# Patient Record
Sex: FEMALE | Race: WHITE | NOT HISPANIC OR LATINO | Employment: UNEMPLOYED | URBAN - NONMETROPOLITAN AREA
[De-identification: names, ages, dates, MRNs, and addresses within clinical notes are randomized per-mention and may not be internally consistent; named-entity substitution may affect disease eponyms.]

---

## 2017-05-23 ENCOUNTER — TRANSCRIBE ORDERS (OUTPATIENT)
Dept: ADMINISTRATIVE | Facility: HOSPITAL | Age: 82
End: 2017-05-23

## 2017-05-23 DIAGNOSIS — M81.0 OSTEOPOROSIS: Primary | ICD-10-CM

## 2017-06-09 ENCOUNTER — HOSPITAL ENCOUNTER (OUTPATIENT)
Dept: BONE DENSITY | Facility: HOSPITAL | Age: 82
Discharge: HOME | End: 2017-06-09
Attending: INTERNAL MEDICINE

## 2019-03-11 ENCOUNTER — HOSPITAL ENCOUNTER (INPATIENT)
Facility: HOSPITAL | Age: 84
LOS: 4 days | Discharge: HOME | End: 2019-03-15
Attending: EMERGENCY MEDICINE | Admitting: INTERNAL MEDICINE

## 2019-03-11 ENCOUNTER — APPOINTMENT (OUTPATIENT)
Dept: GENERAL RADIOLOGY | Facility: HOSPITAL | Age: 84
End: 2019-03-11

## 2019-03-11 DIAGNOSIS — Z79.01 ANTICOAGULATION MANAGEMENT ENCOUNTER: ICD-10-CM

## 2019-03-11 DIAGNOSIS — J90 PLEURAL EFFUSION, LEFT: ICD-10-CM

## 2019-03-11 DIAGNOSIS — E87.79 OTHER HYPERVOLEMIA: ICD-10-CM

## 2019-03-11 DIAGNOSIS — Z51.81 ANTICOAGULATION MANAGEMENT ENCOUNTER: ICD-10-CM

## 2019-03-11 DIAGNOSIS — L03.119 CELLULITIS OF LOWER EXTREMITY, UNSPECIFIED LATERALITY: ICD-10-CM

## 2019-03-11 DIAGNOSIS — I48.91 ATRIAL FIBRILLATION WITH RVR (HCC): Primary | ICD-10-CM

## 2019-03-11 DIAGNOSIS — I50.9 ACUTE CONGESTIVE HEART FAILURE, UNSPECIFIED HEART FAILURE TYPE (HCC): ICD-10-CM

## 2019-03-11 LAB
ALBUMIN SERPL-MCNC: 2.73 G/DL (ref 3.5–5.2)
ALBUMIN/GLOB SERPL: 0.6 G/DL
ALP SERPL-CCNC: 101 U/L (ref 39–117)
ALT SERPL W P-5'-P-CCNC: 12 U/L (ref 1–33)
ANION GAP SERPL CALCULATED.3IONS-SCNC: 10.9 MMOL/L
APTT PPP: 28.8 SECONDS (ref 23.8–36.1)
APTT PPP: 31 SECONDS (ref 23.8–36.1)
APTT PPP: 35 SECONDS (ref 23.8–36.1)
AST SERPL-CCNC: 28 U/L (ref 1–32)
BASOPHILS # BLD AUTO: 0.05 10*3/MM3 (ref 0–0.2)
BASOPHILS NFR BLD AUTO: 0.5 % (ref 0–1.5)
BILIRUB SERPL-MCNC: 0.8 MG/DL (ref 0.1–1.2)
BUN BLD-MCNC: 12 MG/DL (ref 8–23)
BUN/CREAT SERPL: 19.4 (ref 7–25)
CALCIUM SPEC-SCNC: 9 MG/DL (ref 8.6–10.5)
CHLORIDE SERPL-SCNC: 105 MMOL/L (ref 98–107)
CK MB SERPL-CCNC: 1.06 NG/ML
CK MB SERPL-CCNC: 1.16 NG/ML
CK SERPL-CCNC: 29 U/L (ref 20–180)
CK SERPL-CCNC: 31 U/L (ref 20–180)
CO2 SERPL-SCNC: 25.1 MMOL/L (ref 22–29)
CREAT BLD-MCNC: 0.62 MG/DL (ref 0.57–1)
CRP SERPL-MCNC: 0.4 MG/DL (ref 0–0.5)
D-LACTATE SERPL-SCNC: 1.7 MMOL/L (ref 0.5–2)
DEPRECATED RDW RBC AUTO: 53.3 FL (ref 37–54)
EOSINOPHIL # BLD AUTO: 0.2 10*3/MM3 (ref 0–0.4)
EOSINOPHIL NFR BLD AUTO: 1.9 % (ref 0.3–6.2)
ERYTHROCYTE [DISTWIDTH] IN BLOOD BY AUTOMATED COUNT: 15 % (ref 12.3–15.4)
GFR SERPL CREATININE-BSD FRML MDRD: 91 ML/MIN/1.73
GLOBULIN UR ELPH-MCNC: 4.3 GM/DL
GLUCOSE BLD-MCNC: 101 MG/DL (ref 65–99)
HCT VFR BLD AUTO: 44.8 % (ref 34–46.6)
HGB BLD-MCNC: 14.1 G/DL (ref 12–15.9)
IMM GRANULOCYTES # BLD AUTO: 0.02 10*3/MM3 (ref 0–0.05)
IMM GRANULOCYTES NFR BLD AUTO: 0.2 % (ref 0–0.5)
INR PPP: 1.18 (ref 0.9–1.1)
INR PPP: 1.21 (ref 0.9–1.1)
LYMPHOCYTES # BLD AUTO: 2.26 10*3/MM3 (ref 0.7–3.1)
LYMPHOCYTES NFR BLD AUTO: 21.6 % (ref 19.6–45.3)
MAGNESIUM SERPL-MCNC: 1.8 MG/DL (ref 1.6–2.4)
MCH RBC QN AUTO: 31.1 PG (ref 26.6–33)
MCHC RBC AUTO-ENTMCNC: 31.5 G/DL (ref 31.5–35.7)
MCV RBC AUTO: 98.7 FL (ref 79–97)
MONOCYTES # BLD AUTO: 1.24 10*3/MM3 (ref 0.1–0.9)
MONOCYTES NFR BLD AUTO: 11.9 % (ref 5–12)
MYOGLOBIN SERPL-MCNC: 42.1 NG/ML (ref 25–58)
MYOGLOBIN SERPL-MCNC: 48.7 NG/ML (ref 25–58)
NEUTROPHILS # BLD AUTO: 6.67 10*3/MM3 (ref 1.4–7)
NEUTROPHILS NFR BLD AUTO: 63.9 % (ref 42.7–76)
NT-PROBNP SERPL-MCNC: 1955 PG/ML (ref 0–1800)
PLATELET # BLD AUTO: 190 10*3/MM3 (ref 140–450)
PMV BLD AUTO: 10.4 FL (ref 6–12)
POTASSIUM BLD-SCNC: 3.6 MMOL/L (ref 3.5–5.2)
PROT SERPL-MCNC: 7 G/DL (ref 6–8.5)
PROTHROMBIN TIME: 15.2 SECONDS (ref 11–15.4)
PROTHROMBIN TIME: 15.5 SECONDS (ref 11–15.4)
RBC # BLD AUTO: 4.54 10*6/MM3 (ref 3.77–5.28)
SODIUM BLD-SCNC: 141 MMOL/L (ref 136–145)
TROPONIN T SERPL-MCNC: <0.01 NG/ML (ref 0–0.03)
TSH SERPL DL<=0.05 MIU/L-ACNC: 0.53 MIU/ML (ref 0.27–4.2)
TSH SERPL DL<=0.05 MIU/L-ACNC: 0.65 MIU/ML (ref 0.27–4.2)
WBC NRBC COR # BLD: 10.44 10*3/MM3 (ref 3.4–10.8)

## 2019-03-11 PROCEDURE — 82607 VITAMIN B-12: CPT | Performed by: PHYSICIAN ASSISTANT

## 2019-03-11 PROCEDURE — 25010000002 VANCOMYCIN 5 G RECONSTITUTED SOLUTION 5,000 MG VIAL: Performed by: EMERGENCY MEDICINE

## 2019-03-11 PROCEDURE — 85025 COMPLETE CBC W/AUTO DIFF WBC: CPT | Performed by: EMERGENCY MEDICINE

## 2019-03-11 PROCEDURE — 99223 1ST HOSP IP/OBS HIGH 75: CPT | Performed by: INTERNAL MEDICINE

## 2019-03-11 PROCEDURE — 25010000002 PIPERACILLIN-TAZOBACTAM: Performed by: EMERGENCY MEDICINE

## 2019-03-11 PROCEDURE — 82306 VITAMIN D 25 HYDROXY: CPT | Performed by: PHYSICIAN ASSISTANT

## 2019-03-11 PROCEDURE — 84443 ASSAY THYROID STIM HORMONE: CPT | Performed by: INTERNAL MEDICINE

## 2019-03-11 PROCEDURE — 83880 ASSAY OF NATRIURETIC PEPTIDE: CPT | Performed by: EMERGENCY MEDICINE

## 2019-03-11 PROCEDURE — 93005 ELECTROCARDIOGRAM TRACING: CPT | Performed by: EMERGENCY MEDICINE

## 2019-03-11 PROCEDURE — 25010000002 HEPARIN (PORCINE) PER 1000 UNITS: Performed by: PHYSICIAN ASSISTANT

## 2019-03-11 PROCEDURE — 87040 BLOOD CULTURE FOR BACTERIA: CPT | Performed by: EMERGENCY MEDICINE

## 2019-03-11 PROCEDURE — 71045 X-RAY EXAM CHEST 1 VIEW: CPT | Performed by: RADIOLOGY

## 2019-03-11 PROCEDURE — 25010000002 FUROSEMIDE PER 20 MG: Performed by: INTERNAL MEDICINE

## 2019-03-11 PROCEDURE — 94799 UNLISTED PULMONARY SVC/PX: CPT

## 2019-03-11 PROCEDURE — 85730 THROMBOPLASTIN TIME PARTIAL: CPT | Performed by: EMERGENCY MEDICINE

## 2019-03-11 PROCEDURE — 85610 PROTHROMBIN TIME: CPT | Performed by: EMERGENCY MEDICINE

## 2019-03-11 PROCEDURE — 25010000002 FUROSEMIDE PER 20 MG: Performed by: EMERGENCY MEDICINE

## 2019-03-11 PROCEDURE — 83874 ASSAY OF MYOGLOBIN: CPT | Performed by: INTERNAL MEDICINE

## 2019-03-11 PROCEDURE — 84484 ASSAY OF TROPONIN QUANT: CPT | Performed by: EMERGENCY MEDICINE

## 2019-03-11 PROCEDURE — 71045 X-RAY EXAM CHEST 1 VIEW: CPT

## 2019-03-11 PROCEDURE — 82550 ASSAY OF CK (CPK): CPT | Performed by: INTERNAL MEDICINE

## 2019-03-11 PROCEDURE — 85730 THROMBOPLASTIN TIME PARTIAL: CPT | Performed by: PHYSICIAN ASSISTANT

## 2019-03-11 PROCEDURE — 82746 ASSAY OF FOLIC ACID SERUM: CPT | Performed by: PHYSICIAN ASSISTANT

## 2019-03-11 PROCEDURE — 84443 ASSAY THYROID STIM HORMONE: CPT | Performed by: EMERGENCY MEDICINE

## 2019-03-11 PROCEDURE — 93010 ELECTROCARDIOGRAM REPORT: CPT | Performed by: INTERNAL MEDICINE

## 2019-03-11 PROCEDURE — 82553 CREATINE MB FRACTION: CPT | Performed by: INTERNAL MEDICINE

## 2019-03-11 PROCEDURE — 85730 THROMBOPLASTIN TIME PARTIAL: CPT | Performed by: INTERNAL MEDICINE

## 2019-03-11 PROCEDURE — 80053 COMPREHEN METABOLIC PANEL: CPT | Performed by: EMERGENCY MEDICINE

## 2019-03-11 PROCEDURE — 86140 C-REACTIVE PROTEIN: CPT | Performed by: PHYSICIAN ASSISTANT

## 2019-03-11 PROCEDURE — 83605 ASSAY OF LACTIC ACID: CPT | Performed by: EMERGENCY MEDICINE

## 2019-03-11 PROCEDURE — 83735 ASSAY OF MAGNESIUM: CPT | Performed by: INTERNAL MEDICINE

## 2019-03-11 PROCEDURE — 25010000002 DIGOXIN PER 500 MCG: Performed by: EMERGENCY MEDICINE

## 2019-03-11 PROCEDURE — 99285 EMERGENCY DEPT VISIT HI MDM: CPT

## 2019-03-11 PROCEDURE — 85610 PROTHROMBIN TIME: CPT | Performed by: PHYSICIAN ASSISTANT

## 2019-03-11 PROCEDURE — 84484 ASSAY OF TROPONIN QUANT: CPT | Performed by: INTERNAL MEDICINE

## 2019-03-11 PROCEDURE — 25010000002 MAGNESIUM SULFATE IN D5W 1G/100ML (PREMIX) 1-5 GM/100ML-% SOLUTION: Performed by: PHYSICIAN ASSISTANT

## 2019-03-11 RX ORDER — ATENOLOL 50 MG/1
50 TABLET ORAL EVERY 12 HOURS SCHEDULED
Status: CANCELLED | OUTPATIENT
Start: 2019-03-11

## 2019-03-11 RX ORDER — LANOLIN ALCOHOL/MO/W.PET/CERES
1000 CREAM (GRAM) TOPICAL DAILY
Status: DISCONTINUED | OUTPATIENT
Start: 2019-03-12 | End: 2019-03-15 | Stop reason: HOSPADM

## 2019-03-11 RX ORDER — METOPROLOL TARTRATE 5 MG/5ML
5 INJECTION INTRAVENOUS ONCE
Status: COMPLETED | OUTPATIENT
Start: 2019-03-11 | End: 2019-03-11

## 2019-03-11 RX ORDER — ASPIRIN 81 MG/1
81 TABLET ORAL DAILY
Status: CANCELLED | OUTPATIENT
Start: 2019-03-11

## 2019-03-11 RX ORDER — MELATONIN
1000 DAILY
COMMUNITY
End: 2019-03-15 | Stop reason: HOSPADM

## 2019-03-11 RX ORDER — OMEGA-3S/DHA/EPA/FISH OIL/D3 300MG-1000
1000 CAPSULE ORAL DAILY
Status: CANCELLED | OUTPATIENT
Start: 2019-03-11

## 2019-03-11 RX ORDER — SODIUM CHLORIDE 0.9 % (FLUSH) 0.9 %
3-10 SYRINGE (ML) INJECTION AS NEEDED
Status: DISCONTINUED | OUTPATIENT
Start: 2019-03-11 | End: 2019-03-15 | Stop reason: HOSPADM

## 2019-03-11 RX ORDER — FUROSEMIDE 10 MG/ML
40 INJECTION INTRAMUSCULAR; INTRAVENOUS ONCE
Status: DISCONTINUED | OUTPATIENT
Start: 2019-03-11 | End: 2019-03-13

## 2019-03-11 RX ORDER — LANOLIN ALCOHOL/MO/W.PET/CERES
1000 CREAM (GRAM) TOPICAL DAILY
Status: CANCELLED | OUTPATIENT
Start: 2019-03-11

## 2019-03-11 RX ORDER — POTASSIUM CHLORIDE 20 MEQ/1
40 TABLET, EXTENDED RELEASE ORAL EVERY 4 HOURS
Status: COMPLETED | OUTPATIENT
Start: 2019-03-11 | End: 2019-03-11

## 2019-03-11 RX ORDER — MAGNESIUM SULFATE 1 G/100ML
1 INJECTION INTRAVENOUS ONCE
Status: COMPLETED | OUTPATIENT
Start: 2019-03-11 | End: 2019-03-11

## 2019-03-11 RX ORDER — MAGNESIUM SULFATE HEPTAHYDRATE 40 MG/ML
2 INJECTION, SOLUTION INTRAVENOUS AS NEEDED
Status: DISCONTINUED | OUTPATIENT
Start: 2019-03-11 | End: 2019-03-15 | Stop reason: HOSPADM

## 2019-03-11 RX ORDER — HEPARIN SODIUM 10000 [USP'U]/100ML
11.1 INJECTION, SOLUTION INTRAVENOUS
Status: DISCONTINUED | OUTPATIENT
Start: 2019-03-11 | End: 2019-03-12

## 2019-03-11 RX ORDER — SODIUM CHLORIDE 0.9 % (FLUSH) 0.9 %
3 SYRINGE (ML) INJECTION EVERY 12 HOURS SCHEDULED
Status: DISCONTINUED | OUTPATIENT
Start: 2019-03-11 | End: 2019-03-15 | Stop reason: HOSPADM

## 2019-03-11 RX ORDER — LANOLIN ALCOHOL/MO/W.PET/CERES
1000 CREAM (GRAM) TOPICAL DAILY
Status: CANCELLED | OUTPATIENT
Start: 2019-03-12

## 2019-03-11 RX ORDER — ASPIRIN 81 MG/1
81 TABLET ORAL DAILY
Status: DISCONTINUED | OUTPATIENT
Start: 2019-03-12 | End: 2019-03-15 | Stop reason: HOSPADM

## 2019-03-11 RX ORDER — MAGNESIUM SULFATE 1 G/100ML
1 INJECTION INTRAVENOUS AS NEEDED
Status: DISCONTINUED | OUTPATIENT
Start: 2019-03-11 | End: 2019-03-15 | Stop reason: HOSPADM

## 2019-03-11 RX ORDER — ASPIRIN 81 MG/1
81 TABLET ORAL DAILY
Status: DISCONTINUED | OUTPATIENT
Start: 2019-03-11 | End: 2019-03-11

## 2019-03-11 RX ORDER — NITROGLYCERIN 0.4 MG/1
0.4 TABLET SUBLINGUAL
Status: DISCONTINUED | OUTPATIENT
Start: 2019-03-11 | End: 2019-03-15 | Stop reason: HOSPADM

## 2019-03-11 RX ORDER — FAMOTIDINE 20 MG/1
20 TABLET, FILM COATED ORAL DAILY
Status: DISCONTINUED | OUTPATIENT
Start: 2019-03-11 | End: 2019-03-11

## 2019-03-11 RX ORDER — OMEGA-3S/DHA/EPA/FISH OIL/D3 300MG-1000
1000 CAPSULE ORAL DAILY
Status: CANCELLED | OUTPATIENT
Start: 2019-03-12

## 2019-03-11 RX ORDER — ASPIRIN 81 MG/1
81 TABLET ORAL DAILY
Status: CANCELLED | OUTPATIENT
Start: 2019-03-12

## 2019-03-11 RX ORDER — HEPARIN SODIUM 5000 [USP'U]/ML
5000 INJECTION, SOLUTION INTRAVENOUS; SUBCUTANEOUS AS NEEDED
Status: DISCONTINUED | OUTPATIENT
Start: 2019-03-11 | End: 2019-03-12

## 2019-03-11 RX ORDER — HEPARIN SODIUM 5000 [USP'U]/ML
2500 INJECTION, SOLUTION INTRAVENOUS; SUBCUTANEOUS AS NEEDED
Status: DISCONTINUED | OUTPATIENT
Start: 2019-03-11 | End: 2019-03-12

## 2019-03-11 RX ORDER — FUROSEMIDE 10 MG/ML
20 INJECTION INTRAMUSCULAR; INTRAVENOUS EVERY 12 HOURS
Status: DISCONTINUED | OUTPATIENT
Start: 2019-03-11 | End: 2019-03-13

## 2019-03-11 RX ORDER — POTASSIUM CHLORIDE 7.45 MG/ML
10 INJECTION INTRAVENOUS
Status: DISCONTINUED | OUTPATIENT
Start: 2019-03-11 | End: 2019-03-15 | Stop reason: HOSPADM

## 2019-03-11 RX ORDER — LANOLIN ALCOHOL/MO/W.PET/CERES
1000 CREAM (GRAM) TOPICAL DAILY
COMMUNITY

## 2019-03-11 RX ORDER — POTASSIUM CHLORIDE 1.5 G/1.77G
40 POWDER, FOR SOLUTION ORAL AS NEEDED
Status: DISCONTINUED | OUTPATIENT
Start: 2019-03-11 | End: 2019-03-15 | Stop reason: HOSPADM

## 2019-03-11 RX ORDER — POTASSIUM CHLORIDE 750 MG/1
40 CAPSULE, EXTENDED RELEASE ORAL AS NEEDED
Status: DISCONTINUED | OUTPATIENT
Start: 2019-03-11 | End: 2019-03-15 | Stop reason: HOSPADM

## 2019-03-11 RX ORDER — DIGOXIN 0.25 MG/ML
0.5 INJECTION INTRAMUSCULAR; INTRAVENOUS ONCE
Status: COMPLETED | OUTPATIENT
Start: 2019-03-11 | End: 2019-03-11

## 2019-03-11 RX ORDER — L.ACID,PARA/B.BIFIDUM/S.THERM 8B CELL
1 CAPSULE ORAL DAILY
Status: DISCONTINUED | OUTPATIENT
Start: 2019-03-12 | End: 2019-03-15 | Stop reason: HOSPADM

## 2019-03-11 RX ORDER — OMEGA-3S/DHA/EPA/FISH OIL/D3 300MG-1000
1000 CAPSULE ORAL DAILY
Status: DISCONTINUED | OUTPATIENT
Start: 2019-03-12 | End: 2019-03-12

## 2019-03-11 RX ADMIN — HEPARIN SODIUM 11.1 UNITS/KG/HR: 10000 INJECTION, SOLUTION INTRAVENOUS at 17:12

## 2019-03-11 RX ADMIN — POTASSIUM CHLORIDE 40 MEQ: 1500 TABLET, EXTENDED RELEASE ORAL at 20:36

## 2019-03-11 RX ADMIN — DILTIAZEM HYDROCHLORIDE 5 MG/HR: 5 INJECTION INTRAVENOUS at 10:54

## 2019-03-11 RX ADMIN — MAGNESIUM SULFATE IN DEXTROSE 1 G: 10 INJECTION, SOLUTION INTRAVENOUS at 20:36

## 2019-03-11 RX ADMIN — POTASSIUM CHLORIDE 40 MEQ: 1500 TABLET, EXTENDED RELEASE ORAL at 23:34

## 2019-03-11 RX ADMIN — METOPROLOL TARTRATE 5 MG: 5 INJECTION, SOLUTION INTRAVENOUS at 14:23

## 2019-03-11 RX ADMIN — PIPERACILLIN SODIUM,TAZOBACTAM SODIUM 3.38 G: 3; .375 INJECTION, POWDER, FOR SOLUTION INTRAVENOUS at 11:26

## 2019-03-11 RX ADMIN — DIGOXIN 0.5 MG: 0.25 INJECTION INTRAMUSCULAR; INTRAVENOUS at 12:37

## 2019-03-11 RX ADMIN — DILTIAZEM HYDROCHLORIDE 15 MG/HR: 5 INJECTION INTRAVENOUS at 16:30

## 2019-03-11 RX ADMIN — VANCOMYCIN HYDROCHLORIDE 1750 MG: 5 INJECTION, POWDER, LYOPHILIZED, FOR SOLUTION INTRAVENOUS at 12:01

## 2019-03-11 RX ADMIN — FUROSEMIDE 20 MG: 10 INJECTION, SOLUTION INTRAMUSCULAR; INTRAVENOUS at 23:33

## 2019-03-11 RX ADMIN — DOXYCYCLINE 100 MG: 100 INJECTION, POWDER, LYOPHILIZED, FOR SOLUTION INTRAVENOUS at 21:41

## 2019-03-11 RX ADMIN — FAMOTIDINE 20 MG: 20 TABLET, FILM COATED ORAL at 20:36

## 2019-03-11 NOTE — ED PROVIDER NOTES
Subjective   87-year-old white female here for rapid heart beat.  Patient states that she went to her primary doctor (Dr. Yang) today for leg swelling and redness.  Her heart rate was elevated and so he sent her to the ED.  She denies any palpitations or chest pain, but says she does have some dyspnea on exertion.  She denies any previous history of atrial fibrillation.  She said she said the swelling and redness in her legs for the past month.            Review of Systems   All other systems reviewed and are negative.      Past Medical History:   Diagnosis Date   • COPD (chronic obstructive pulmonary disease) (CMS/HCC)    • Disease of thyroid gland    • Hypertension    • Osteoporosis        Allergies   Allergen Reactions   • Sulfa Antibiotics        Past Surgical History:   Procedure Laterality Date   • ELECTRICAL CARDIOVERSION  3/13/2019            History reviewed. No pertinent family history.    Social History     Socioeconomic History   • Marital status:      Spouse name: Not on file   • Number of children: Not on file   • Years of education: Not on file   • Highest education level: Not on file   Tobacco Use   • Smoking status: Former Smoker   Substance and Sexual Activity   • Alcohol use: No   • Drug use: No           Objective   Physical Exam   Constitutional: She is oriented to person, place, and time. She appears well-developed and well-nourished.   HENT:   Head: Normocephalic and atraumatic.   Neck: Normal range of motion. Neck supple.   Cardiovascular: An irregularly irregular rhythm present. Tachycardia present.   Pulmonary/Chest: Effort normal. No respiratory distress. She has no wheezes. She has no rhonchi. She has rales in the left lower field.   Abdominal: Soft. Bowel sounds are normal. She exhibits no distension. There is no tenderness.   Musculoskeletal: Normal range of motion. She exhibits edema (3+ edema bilateral lower extremities to just above the knee.).   Neurological: She is alert  and oriented to person, place, and time.   Skin: Skin is warm and dry.        Bilateral lower extremity erythema from the ankles to two thirds of the way to the knee.  There is some superficial postinflammatory skin flaking/sloughing.   Psychiatric: She has a normal mood and affect.   Nursing note and vitals reviewed.      Procedures           ED Course  ED Course as of Mar 17 0751   Mon Mar 11, 2019   1433 Discussed with patient and family results of her workup.  Currently, her heart rate is improved to 96.  Have discussed with Dr. Beyer.  Patient admitted, see orders.  [BC]      ED Course User Index  [BC] Terry Barillas MD                  MDM  Number of Diagnoses or Management Options  Acute congestive heart failure, unspecified heart failure type (CMS/HCC):   Atrial fibrillation with RVR (CMS/HCC):   Cellulitis of lower extremity, unspecified laterality:   Pleural effusion, left:      Amount and/or Complexity of Data Reviewed  Clinical lab tests: reviewed  Tests in the radiology section of CPT®: reviewed  Tests in the medicine section of CPT®: reviewed    Risk of Complications, Morbidity, and/or Mortality  Presenting problems: high  Diagnostic procedures: moderate  Management options: high          Final diagnoses:   Atrial fibrillation with RVR (CMS/HCC)   Acute congestive heart failure, unspecified heart failure type (CMS/HCC)   Pleural effusion, left   Cellulitis of lower extremity, unspecified laterality            Terry Barillas MD  03/17/19 9709

## 2019-03-11 NOTE — H&P
"     [facility] Medicine Services  HISTORY & PHYSICAL    Patient Identification:  Name:  Kayleen Brasher  Age:  87 y.o.  Sex:  female  :  1931  MRN:  [id number]   Visit Number:  [id number]  Primary Care Physician:  Tono Yang MD     Subjective     Chief complaint:   Chief Complaint   Patient presents with   • Rapid Heart Rate   • Edema     History of presenting illness:   Patient is a 87 y.o. female with past medical history significant for essential hypertension, non oxygen dependent COPD, hypothyroidism, and advanced age that presented to the [facility] emergency department for evaluation of tachycardia and lower extremity edema and erythema. Patient states that for the past few weeks sh has had bilateral lower extremity edema and erythema that has progressively worsened. She states that her legs are both painful to touch and pain is also worsened with ambulation. She states that she went to PCP today to be seen for her legs, however she states shortly after being seen she was sent to ED for further evaluation. At her PCP Dr. Yang's office, her heart rate was found to be significantly elevated, which warranted ED transfer. Patient denies any fevers or chills, states she is chronically cold. She denies any chest pain or pressure. She states she will intermittently have a \"flutter\" sensation in her chest. She denies any history of arrhythmia or history of heart issues. She denies any shortness of breath. She does report cough and nasal drainage/congestion recently. No wheezing. Denies any urinary symptoms. No abdominal pain, nausea, vomiting, or change in bowel movements.     Upon arrival to the ED, vitals were temperature 96.5, , RR 20, /121, and oxygen saturation 99% on room air. O2 saturation did drop to 87-88% in ED.  Troponin T negative.  ProBNP 1955.0.  CMP unremarkable other than albumin 2.73.  Lactic acid within normal limits.  CBC unremarkable " "other than MCV 98.7.  Chest x-ray with small left pleural effusion, otherwise unremarkable.  EKG in ED with atrial fibrillation with rapid ventricular response, heart rate 164 bpm.  While in the emergency department, patient was given a 20 mg IV bolus of Cardizem with a drip of 15 mg/h thereafter.  Patient was also given a one-time dose of IV metoprolol 5 mg and 40 mg IV Lasix.  Despite Cardizem and metoprolol, patient continued with elevated heart rate and was therefore later given a one-time dose of 0.5 mg IV digoxin.  Rate did improve to 90s-110s.    Dr. Beyer:  The patient was seen this evening and was resting in bed.  was at bedside during my exam. The patient reports worsening dyspnea on exertion that has been progressive for approximately 6-8 months. She reports that it was noted while walking up the stairs to her great grandson's basketball game. The patient reports \"occasional fluttering\" in her chest for quite some time but never paid any attention. She also reports cough that has been present for several weeks. She states that occasionally it is with clear/yellow sputum production. She states that it is improving. She states that her legs have been red x 2 weeks. She states that she has been wearing stockings to help with swelling. She states that typically she is independent at home.     Patient has been admitted to the telemetry floor for further evaluation and treatment.     Present during exam: Analilia EDMOND  ---------------------------------------------------------------------------------------------------------------------   Review of Systems   Constitutional: Negative for activity change, appetite change, chills, diaphoresis, fatigue and fever.   HENT: Positive for congestion and postnasal drip. Negative for rhinorrhea, sinus pain, sore throat and trouble swallowing.    Eyes: Negative for discharge and visual disturbance.   Respiratory: Positive for cough. Negative for chest tightness, " shortness of breath and wheezing.    Cardiovascular: Positive for palpitations and leg swelling. Negative for chest pain.   Gastrointestinal: Negative for abdominal pain, constipation, diarrhea, nausea and vomiting.   Endocrine: Negative for cold intolerance and heat intolerance.   Genitourinary: Negative for decreased urine volume, dysuria, frequency and urgency.   Musculoskeletal: Negative for arthralgias, gait problem and myalgias.   Skin: Positive for color change (Redness of bilateral lower extremities from the knee down). Negative for rash and wound.   Allergic/Immunologic: Negative for environmental allergies and immunocompromised state.   Neurological: Negative for dizziness, syncope, weakness, light-headedness and headaches.   Hematological: Negative for adenopathy. Does not bruise/bleed easily.   Psychiatric/Behavioral: Negative for confusion and decreased concentration. The patient is not nervous/anxious.       ---------------------------------------------------------------------------------------------------------------------   Past Medical History:   Diagnosis Date   • COPD (chronic obstructive pulmonary disease) (CMS/[facility])    • Disease of thyroid gland    • Hypertension    • Osteoporosis      History reviewed. No pertinent surgical history.  History reviewed. No pertinent family history.  Social History     Socioeconomic History   • Marital status:      Spouse name: Not on file   • Number of children: Not on file   • Years of education: Not on file   • Highest education level: Not on file   Tobacco Use   • Smoking status: Former Smoker   Substance and Sexual Activity   • Alcohol use: No   • Drug use: No     ---------------------------------------------------------------------------------------------------------------------   Allergies:  Sulfa antibiotics  ---------------------------------------------------------------------------------------------------------------------   Medications below are  reported home medications pulling from within the system; at this time, these medications have not been reconciled unless otherwise specified and are in the verification process for further verifcation as current home medications.    Prior to Admission Medications     Prescriptions Last Dose Informant Patient Reported? Taking?    aspirin 81 MG EC tablet 3/11/2019 Self Yes Yes    Take 81 mg by mouth Daily.    atenolol (TENORMIN) 50 MG tablet 3/11/2019 Pharmacy Yes Yes    Take 50 mg by mouth 2 (Two) Times a Day.    cholecalciferol (VITAMIN D3) 1000 units tablet 3/11/2019 Self Yes Yes    Take 1,000 Units by mouth Daily.    vitamin B-12 (CYANOCOBALAMIN) 1000 MCG tablet 3/11/2019 Self Yes Yes    Take 1,000 mcg by mouth Daily.        ---------------------------------------------------------------------------------------------------------------------    Objective     Hospital Scheduled Meds:    furosemide 40 mg Intravenous Once       diltiaZEM 5-15 mg/hr Last Rate: Stopped (03/11/19 1430)       Current listed hospital scheduled medications may not yet reflect those currently placed in orders that are signed and held, awaiting patient's arrival to floor/unit.    ---------------------------------------------------------------------------------------------------------------------   Vital Signs:  Temp:  [96.5 °F (35.8 °C)] 96.5 °F (35.8 °C)  Heart Rate:  [] 90  Resp:  [20] 20  BP: ()/() 102/85  Mean Arterial Pressure (Non-Invasive) for the past 24 hrs (Last 3 readings):   Noninvasive MAP (mmHg)   03/11/19 1551 91   03/11/19 1502 99   03/11/19 1447 95     SpO2 Percentage    03/11/19 1447 03/11/19 1502 03/11/19 1551   SpO2: 98% 92% 94%     SpO2:  [87 %-99 %] 94 %  on   ;   Device (Oxygen Therapy): room air    Body mass index is 34.9 kg/m².  Wt Readings from Last 3 Encounters:   03/11/19 89.4 kg (197 lb)   11/26/16 90.7 kg (200 lb)      ---------------------------------------------------------------------------------------------------------------------   Physical Exam:  Physical Exam   Constitutional: She is oriented to person, place, and time. She appears well-developed and well-nourished.  Non-toxic appearance. No distress.   Pleasant, Elderly   HENT:   Head: Normocephalic and atraumatic.   Right Ear: External ear normal.   Left Ear: External ear normal.   Nose: Nose normal.   Mouth/Throat: Oropharynx is clear and moist and mucous membranes are normal. No oropharyngeal exudate.   Eyes: Conjunctivae and EOM are normal. Pupils are equal, round, and reactive to light. No scleral icterus.   Neck: Trachea normal and normal range of motion. Neck supple. No JVD present. No muscular tenderness present. Carotid bruit is not present. No tracheal deviation present. No thyromegaly present.   Cardiovascular: Normal heart sounds and intact distal pulses. An irregularly irregular rhythm present. Tachycardia present. Exam reveals no gallop and no friction rub.   No murmur heard.  Heart rate 90s-120s   Pulmonary/Chest: Effort normal and breath sounds normal. No respiratory distress. She has no wheezes. She has no rhonchi. She has no rales. She exhibits no tenderness.   Abdominal: Soft. Bowel sounds are normal. She exhibits no distension and no mass. There is no hepatosplenomegaly. There is no tenderness. There is no rebound and no guarding. No hernia.   Obese   Genitourinary:   Genitourinary Comments: No Schafer catheter in place, making urine   Musculoskeletal: She exhibits no deformity.        Right lower leg: She exhibits tenderness and edema.        Left lower leg: She exhibits tenderness and edema.     Vascular Status -  Her right foot exhibits abnormal foot edema. Her right foot exhibits normal foot vasculature . Her left foot exhibits abnormal foot edema. Her left foot exhibits normal foot vasculature .  Neurological: She is alert and oriented to person,  place, and time. She has normal strength. No cranial nerve deficit or sensory deficit.   Skin: Skin is warm and dry. Capillary refill takes less than 2 seconds. No rash noted. There is erythema (Bilateral lower extremities from the knee to ankle). No pallor.   Psychiatric: She has a normal mood and affect. Her speech is normal and behavior is normal. Judgment and thought content normal.   Nursing note and vitals reviewed.    ---------------------------------------------------------------------------------------------------------------------  EKG:        Telemetry:    Atrial fibrillation 90s-120s    I have personally reviewed the EKG/Telemetry strip  ---------------------------------------------------------------------------------------------------------------------   Results from last 7 days   Lab Units 03/11/19  1115   TROPONIN T ng/mL <0.010     Results from last 7 days   Lab Units 03/11/19  1115   PROBNP pg/mL 1,955.0*         Results from last 7 days   Lab Units 03/11/19  1115   LACTATE mmol/L 1.7   WBC 10*3/mm3 10.44   HEMOGLOBIN g/dL 14.1   HEMATOCRIT % 44.8   MCV fL 98.7*   MCHC g/dL 31.5   PLATELETS 10*3/mm3 190   INR  1.18*     Results from last 7 days   Lab Units 03/11/19  1115   SODIUM mmol/L 141   POTASSIUM mmol/L 3.6   CHLORIDE mmol/L 105   CO2 mmol/L 25.1   BUN mg/dL 12   CREATININE mg/dL 0.62   EGFR IF NONAFRICN AM mL/min/1.73 91   CALCIUM mg/dL 9.0   GLUCOSE mg/dL 101*   ALBUMIN g/dL 2.73*   BILIRUBIN mg/dL 0.8   ALK PHOS U/L 101   AST (SGOT) U/L 28   ALT (SGPT) U/L 12   Estimated Creatinine Clearance: 52.6 mL/min (by C-G formula based on SCr of 0.62 mg/dL).    No results found for: HGBA1C  Lab Results   Component Value Date    TSH 0.645 03/11/2019     I have personally reviewed the above laboratory results.   ---------------------------------------------------------------------------------------------------------------------  Imaging Results (last 7 days)     Procedure Component Value Units  Date/Time    XR Chest 1 View [[id number]] Collected:  03/11/19 1051     Updated:  03/11/19 1150    Narrative:       FINDINGS:   The lungs remain aerated.  Heart and mediastinum contours are unremarkable.  SMALL LEFT PLEURAL EFFUSION.  No pneumothorax.   Bony and soft tissue structures are unremarkable.    Impression:       SMALL LEFT PLEURAL EFFUSION.     This report was finalized on 3/11/2019 10:51 AM by Dr. Gregg Stapleton MD.      I have personally reviewed the above radiology results.   ---------------------------------------------------------------------------------------------------------------------    Assessment & Plan      · New onset atrial fibrillation with rapid ventricular response: Telemetry monitoring in place. Patient now more rate controlled after IV cardizem bolus, cardizem gtt, IV metoprolol, and IV push of Digoxin in ED. Cont cardizem gtt for rate control. [id number] score of at least 4 (age, female, HTN hx). Will start heparin gtt, no bolus for anticoagulation. ECHO ordered for in AM. TSH ordered and pending. Will consider cardiology consultation.   · Bilateral lower extremity edema and cellulitis: Blood cultures obtained.  Patient afebrile.  Lactic acid within normal limits, will obtain stat CRP.  PRN Tylenol available.  Lasix 40 mg IV given in the ED.  There is warmth bilaterally on palpation.  Will cover with empiric IV vancomycin.  · Volume overload: Patient with pitting edema bilateral lower extremities and small left pleural effusion on CXR. ProBNP elevated slightly. Lasix 40 mg IV given in ED. Monitor for diuretic response. Strict Is and Os, daily weights. Hold off on further diuresis for now. ECHO in AM.   · COPD without acute exacerbation: Supplemental O2 as needed to titrate SpO2 > 90%. PRN nebs available.   · Hypothyroidism: Documented history of hypothyroidism, patient is not on any thyroid hormone replacement therapy.  Will obtain TSH.  Follow-up final results.  · Essential  hypertension: BP controlled. Pt on atenolol at home, will hold this for now. Pt on Cardizem gtt, consider starting metoprolol PO. Closely monitor blood pressure per hospital protocol and titrate medications as necessary.  · Macrocytosis, without anemia: Will obtain Vitamin B12, folate, and Vitamin D levels. Supplement if patient is found to be deficient.   · Hypoalbuminemia   · Former smoker   · F/E/N: no IV fluids. Replace electrolytes per protocol as necessary. Cardiac diet.     ---------------------------------------------------  DVT Prophylaxis: Heparin gtt   GI Prophylaxis: Pepcid   Activity: bed rest     The patient is considered to be a high risk patient due to: New onset afib RVR, bilateral lower extremity edema and cellulitis, volume overload, former smoker, copd    INPATIENT status due to the need for care which can only be reasonably provided in an hospital setting such as aggressive/expedited ancillary services and/or consultation services, the necessity for IV medications, close physician monitoring and/or the possible need for procedures.  In such, I feel patient’s risk for adverse outcomes and need for care warrant INPATIENT evaluation and predict the patient’s care encounter to likely last beyond 2 midnights.    Code Status: FULL CODE     I have discussed the patient's assessment and plan with the patient and Analilia Mcpherson PA-C  Hospitalist Service -- [facility]   Pager: [phone]    03/11/19  3:52 PM    Attending Physician: Lisa Beyer DO    Brief Attending Note:  On exam, the patient was resting in bed. She is in no distress. HEENT exam is unremarkable. Heart is regularly/irregular; no m/g/r. Lungs are clear; slightly diminished breath sounds at the bases. Abdomen is soft, NT/ND with + BS. Legs are with bilateral erythema, ankle to below the knee with 1-2+ edema bilaterally. Skin is hyperpigmented. Neurologically she is intact.     -Atrial fibrillation, suspected  new onset, presenting with rapid ventricular rate: Admit to telemetry. Check Mg and TSH. Continue heparin infusion and cardizem drip. Consider addition of a statin.     -Elevated pro-BNP; may be due to above versus newly diagnosed CHF: Continue IV Lasix that was started in the ED. Place dixon catheter. Continue to trend cardiac enzymes. Order dixon catheter for strict I/O.     -Bilateral lower extremity cellulitis likely venous stasis cellulitis: Begin IV doxycycline. If no clinical improvement, escalate to Vancomycin. Obtain B/L LE doppler US.     CODE: FULL/CPR - patient states and  confirms that she does not want to be on mechanical ventilation for longer than 3 days. No artificial nutrition. She has to think about short-term hemodialysis. Blood products, vasopressors and NIPPV is permitted.       ---------------------------------------------------------------------------------------------------------------------

## 2019-03-11 NOTE — ED NOTES
Cardizem restarted @ 15mg/hr per Dr. Barillas's verbal orders.      Kimmy Crystal, RN  03/11/19 1203

## 2019-03-11 NOTE — PLAN OF CARE
Problem: Fall Risk (Adult)  Goal: Identify Related Risk Factors and Signs and Symptoms  Outcome: Ongoing (interventions implemented as appropriate)    Goal: Absence of Fall  Outcome: Ongoing (interventions implemented as appropriate)      Problem: Patient Care Overview  Goal: Plan of Care Review  Outcome: Ongoing (interventions implemented as appropriate)    Goal: Individualization and Mutuality  Outcome: Ongoing (interventions implemented as appropriate)    Goal: Discharge Needs Assessment  Outcome: Ongoing (interventions implemented as appropriate)    Goal: Interprofessional Rounds/Family Conf  Outcome: Ongoing (interventions implemented as appropriate)      Problem: Cardiac Output Decreased (Adult)  Goal: Identify Related Risk Factors and Signs and Symptoms  Outcome: Ongoing (interventions implemented as appropriate)    Goal: Effective Tissue Perfusion  Outcome: Ongoing (interventions implemented as appropriate)

## 2019-03-12 ENCOUNTER — APPOINTMENT (OUTPATIENT)
Dept: CARDIOLOGY | Facility: HOSPITAL | Age: 84
End: 2019-03-12

## 2019-03-12 ENCOUNTER — APPOINTMENT (OUTPATIENT)
Dept: ULTRASOUND IMAGING | Facility: HOSPITAL | Age: 84
End: 2019-03-12

## 2019-03-12 PROBLEM — L03.119 LOWER EXTREMITY CELLULITIS: Status: ACTIVE | Noted: 2019-03-12

## 2019-03-12 PROBLEM — J44.1 COPD WITH ACUTE EXACERBATION (HCC): Status: ACTIVE | Noted: 2019-03-12

## 2019-03-12 PROBLEM — E03.8 OTHER SPECIFIED HYPOTHYROIDISM: Status: ACTIVE | Noted: 2019-03-12

## 2019-03-12 PROBLEM — I10 ESSENTIAL HYPERTENSION: Status: ACTIVE | Noted: 2019-03-12

## 2019-03-12 LAB
25(OH)D3 SERPL-MCNC: >120 NG/ML (ref 30–100)
ALBUMIN SERPL-MCNC: 2.66 G/DL (ref 3.5–5.2)
ALBUMIN/GLOB SERPL: 0.6 G/DL
ALP SERPL-CCNC: 94 U/L (ref 39–117)
ALT SERPL W P-5'-P-CCNC: 11 U/L (ref 1–33)
ANION GAP SERPL CALCULATED.3IONS-SCNC: 7.7 MMOL/L
APTT PPP: >100 SECONDS (ref 23.8–36.1)
AST SERPL-CCNC: 28 U/L (ref 1–32)
BASOPHILS # BLD AUTO: 0.05 10*3/MM3 (ref 0–0.2)
BASOPHILS NFR BLD AUTO: 0.4 % (ref 0–1.5)
BH CV ECHO MEAS - % IVS THICK: 46.3 %
BH CV ECHO MEAS - % LVPW THICK: 87 %
BH CV ECHO MEAS - ACS: 1.7 CM
BH CV ECHO MEAS - AO MAX PG: 10.8 MMHG
BH CV ECHO MEAS - AO MEAN PG: 5.3 MMHG
BH CV ECHO MEAS - AO ROOT AREA (BSA CORRECTED): 1.4
BH CV ECHO MEAS - AO ROOT AREA: 5.3 CM^2
BH CV ECHO MEAS - AO ROOT DIAM: 2.6 CM
BH CV ECHO MEAS - AO V2 MAX: 164.2 CM/SEC
BH CV ECHO MEAS - AO V2 MEAN: 106.2 CM/SEC
BH CV ECHO MEAS - AO V2 VTI: 26.2 CM
BH CV ECHO MEAS - BSA(HAYCOCK): 2 M^2
BH CV ECHO MEAS - BSA: 1.9 M^2
BH CV ECHO MEAS - BZI_BMI: 36.6 KILOGRAMS/M^2
BH CV ECHO MEAS - BZI_METRIC_HEIGHT: 157.5 CM
BH CV ECHO MEAS - BZI_METRIC_WEIGHT: 90.7 KG
BH CV ECHO MEAS - EDV(CUBED): 56.2 ML
BH CV ECHO MEAS - EDV(MOD-SP4): 30 ML
BH CV ECHO MEAS - EDV(TEICH): 63.2 ML
BH CV ECHO MEAS - EF(CUBED): 77.7 %
BH CV ECHO MEAS - EF(TEICH): 70.6 %
BH CV ECHO MEAS - ESV(CUBED): 12.6 ML
BH CV ECHO MEAS - ESV(MOD-SP4): 16 ML
BH CV ECHO MEAS - ESV(TEICH): 18.6 ML
BH CV ECHO MEAS - FS: 39.3 %
BH CV ECHO MEAS - IVS/LVPW: 1.3
BH CV ECHO MEAS - IVSD: 1.3 CM
BH CV ECHO MEAS - IVSS: 1.8 CM
BH CV ECHO MEAS - LA DIMENSION: 3.9 CM
BH CV ECHO MEAS - LA/AO: 1.5
BH CV ECHO MEAS - LV DIASTOLIC VOL/BSA (35-75): 15.7 ML/M^2
BH CV ECHO MEAS - LV MASS(C)D: 141.2 GRAMS
BH CV ECHO MEAS - LV MASS(C)DI: 73.9 GRAMS/M^2
BH CV ECHO MEAS - LV MASS(C)S: 172.3 GRAMS
BH CV ECHO MEAS - LV MASS(C)SI: 90.2 GRAMS/M^2
BH CV ECHO MEAS - LV SYSTOLIC VOL/BSA (12-30): 8.4 ML/M^2
BH CV ECHO MEAS - LVIDD: 3.8 CM
BH CV ECHO MEAS - LVIDS: 2.3 CM
BH CV ECHO MEAS - LVLD AP4: 5.7 CM
BH CV ECHO MEAS - LVLS AP4: 5.1 CM
BH CV ECHO MEAS - LVOT AREA (M): 2.8 CM^2
BH CV ECHO MEAS - LVOT AREA: 2.8 CM^2
BH CV ECHO MEAS - LVOT DIAM: 1.9 CM
BH CV ECHO MEAS - LVPWD: 1 CM
BH CV ECHO MEAS - LVPWS: 1.9 CM
BH CV ECHO MEAS - MV A MAX VEL: 48.9 CM/SEC
BH CV ECHO MEAS - MV E MAX VEL: 129 CM/SEC
BH CV ECHO MEAS - MV E/A: 2.6
BH CV ECHO MEAS - PA ACC SLOPE: 1478 CM/SEC^2
BH CV ECHO MEAS - PA ACC TIME: 0.08 SEC
BH CV ECHO MEAS - PA PR(ACCEL): 41 MMHG
BH CV ECHO MEAS - RAP SYSTOLE: 10 MMHG
BH CV ECHO MEAS - RVSP: 41 MMHG
BH CV ECHO MEAS - SI(AO): 73.1 ML/M^2
BH CV ECHO MEAS - SI(CUBED): 22.9 ML/M^2
BH CV ECHO MEAS - SI(MOD-SP4): 7.3 ML/M^2
BH CV ECHO MEAS - SI(TEICH): 23.3 ML/M^2
BH CV ECHO MEAS - SV(AO): 139.7 ML
BH CV ECHO MEAS - SV(CUBED): 43.7 ML
BH CV ECHO MEAS - SV(MOD-SP4): 14 ML
BH CV ECHO MEAS - SV(TEICH): 44.6 ML
BH CV ECHO MEAS - TR MAX VEL: 278.3 CM/SEC
BILIRUB SERPL-MCNC: 0.7 MG/DL (ref 0.1–1.2)
BUN BLD-MCNC: 10 MG/DL (ref 8–23)
BUN/CREAT SERPL: 15.9 (ref 7–25)
CALCIUM SPEC-SCNC: 8.3 MG/DL (ref 8.6–10.5)
CHLORIDE SERPL-SCNC: 105 MMOL/L (ref 98–107)
CK MB SERPL-CCNC: 1.03 NG/ML
CK SERPL-CCNC: 28 U/L (ref 20–180)
CO2 SERPL-SCNC: 26.3 MMOL/L (ref 22–29)
CREAT BLD-MCNC: 0.63 MG/DL (ref 0.57–1)
DEPRECATED RDW RBC AUTO: 53.5 FL (ref 37–54)
EOSINOPHIL # BLD AUTO: 0.54 10*3/MM3 (ref 0–0.4)
EOSINOPHIL NFR BLD AUTO: 4.2 % (ref 0.3–6.2)
ERYTHROCYTE [DISTWIDTH] IN BLOOD BY AUTOMATED COUNT: 15.1 % (ref 12.3–15.4)
FOLATE SERPL-MCNC: >20 NG/ML (ref 4.78–24.2)
GFR SERPL CREATININE-BSD FRML MDRD: 89 ML/MIN/1.73
GLOBULIN UR ELPH-MCNC: 4.2 GM/DL
GLUCOSE BLD-MCNC: 105 MG/DL (ref 65–99)
HCT VFR BLD AUTO: 44.9 % (ref 34–46.6)
HGB BLD-MCNC: 14.1 G/DL (ref 12–15.9)
IMM GRANULOCYTES # BLD AUTO: 0.03 10*3/MM3 (ref 0–0.05)
IMM GRANULOCYTES NFR BLD AUTO: 0.2 % (ref 0–0.5)
LYMPHOCYTES # BLD AUTO: 5.17 10*3/MM3 (ref 0.7–3.1)
LYMPHOCYTES NFR BLD AUTO: 40.3 % (ref 19.6–45.3)
MAGNESIUM SERPL-MCNC: 2 MG/DL (ref 1.6–2.4)
MAXIMAL PREDICTED HEART RATE: 133 BPM
MCH RBC QN AUTO: 31.1 PG (ref 26.6–33)
MCHC RBC AUTO-ENTMCNC: 31.4 G/DL (ref 31.5–35.7)
MCV RBC AUTO: 98.9 FL (ref 79–97)
MONOCYTES # BLD AUTO: 1.45 10*3/MM3 (ref 0.1–0.9)
MONOCYTES NFR BLD AUTO: 11.3 % (ref 5–12)
MYOGLOBIN SERPL-MCNC: 36.8 NG/ML (ref 25–58)
NEUTROPHILS # BLD AUTO: 5.6 10*3/MM3 (ref 1.4–7)
NEUTROPHILS NFR BLD AUTO: 43.6 % (ref 42.7–76)
PHOSPHATE SERPL-MCNC: 2.6 MG/DL (ref 2.5–4.5)
PLATELET # BLD AUTO: 204 10*3/MM3 (ref 140–450)
PMV BLD AUTO: 11.3 FL (ref 6–12)
POTASSIUM BLD-SCNC: 4.8 MMOL/L (ref 3.5–5.2)
PROT SERPL-MCNC: 6.9 G/DL (ref 6–8.5)
RBC # BLD AUTO: 4.54 10*6/MM3 (ref 3.77–5.28)
SODIUM BLD-SCNC: 139 MMOL/L (ref 136–145)
STRESS TARGET HR: 113 BPM
TROPONIN T SERPL-MCNC: <0.01 NG/ML (ref 0–0.03)
VIT B12 BLD-MCNC: 1307 PG/ML (ref 211–946)
WBC NRBC COR # BLD: 12.84 10*3/MM3 (ref 3.4–10.8)

## 2019-03-12 PROCEDURE — 93306 TTE W/DOPPLER COMPLETE: CPT

## 2019-03-12 PROCEDURE — 85730 THROMBOPLASTIN TIME PARTIAL: CPT | Performed by: INTERNAL MEDICINE

## 2019-03-12 PROCEDURE — 99233 SBSQ HOSP IP/OBS HIGH 50: CPT | Performed by: PHYSICIAN ASSISTANT

## 2019-03-12 PROCEDURE — 25010000002 HEPARIN (PORCINE) PER 1000 UNITS: Performed by: PHYSICIAN ASSISTANT

## 2019-03-12 PROCEDURE — 80053 COMPREHEN METABOLIC PANEL: CPT | Performed by: INTERNAL MEDICINE

## 2019-03-12 PROCEDURE — 94799 UNLISTED PULMONARY SVC/PX: CPT

## 2019-03-12 PROCEDURE — 93970 EXTREMITY STUDY: CPT

## 2019-03-12 PROCEDURE — 84100 ASSAY OF PHOSPHORUS: CPT | Performed by: PHYSICIAN ASSISTANT

## 2019-03-12 PROCEDURE — 83874 ASSAY OF MYOGLOBIN: CPT | Performed by: INTERNAL MEDICINE

## 2019-03-12 PROCEDURE — 93306 TTE W/DOPPLER COMPLETE: CPT | Performed by: INTERNAL MEDICINE

## 2019-03-12 PROCEDURE — 85025 COMPLETE CBC W/AUTO DIFF WBC: CPT | Performed by: INTERNAL MEDICINE

## 2019-03-12 PROCEDURE — 25010000002 FUROSEMIDE PER 20 MG: Performed by: INTERNAL MEDICINE

## 2019-03-12 PROCEDURE — 93970 EXTREMITY STUDY: CPT | Performed by: RADIOLOGY

## 2019-03-12 PROCEDURE — 84484 ASSAY OF TROPONIN QUANT: CPT | Performed by: INTERNAL MEDICINE

## 2019-03-12 PROCEDURE — 82550 ASSAY OF CK (CPK): CPT | Performed by: INTERNAL MEDICINE

## 2019-03-12 PROCEDURE — 83735 ASSAY OF MAGNESIUM: CPT | Performed by: PHYSICIAN ASSISTANT

## 2019-03-12 PROCEDURE — 99222 1ST HOSP IP/OBS MODERATE 55: CPT | Performed by: INTERNAL MEDICINE

## 2019-03-12 PROCEDURE — 82553 CREATINE MB FRACTION: CPT | Performed by: INTERNAL MEDICINE

## 2019-03-12 RX ADMIN — FUROSEMIDE 20 MG: 10 INJECTION, SOLUTION INTRAMUSCULAR; INTRAVENOUS at 07:38

## 2019-03-12 RX ADMIN — APIXABAN 5 MG: 5 TABLET, FILM COATED ORAL at 20:21

## 2019-03-12 RX ADMIN — METOPROLOL TARTRATE 25 MG: 25 TABLET, FILM COATED ORAL at 20:21

## 2019-03-12 RX ADMIN — Medication 1 CAPSULE: at 07:38

## 2019-03-12 RX ADMIN — DILTIAZEM HYDROCHLORIDE 15 MG/HR: 5 INJECTION INTRAVENOUS at 03:48

## 2019-03-12 RX ADMIN — DILTIAZEM HYDROCHLORIDE 15 MG/HR: 5 INJECTION INTRAVENOUS at 14:27

## 2019-03-12 RX ADMIN — ASPIRIN 81 MG: 81 TABLET ORAL at 07:38

## 2019-03-12 RX ADMIN — SODIUM CHLORIDE, PRESERVATIVE FREE 3 ML: 5 INJECTION INTRAVENOUS at 07:40

## 2019-03-12 RX ADMIN — SODIUM CHLORIDE, PRESERVATIVE FREE 3 ML: 5 INJECTION INTRAVENOUS at 20:21

## 2019-03-12 RX ADMIN — METOPROLOL TARTRATE 25 MG: 25 TABLET, FILM COATED ORAL at 12:53

## 2019-03-12 RX ADMIN — APIXABAN 5 MG: 5 TABLET, FILM COATED ORAL at 12:53

## 2019-03-12 RX ADMIN — HEPARIN SODIUM 5000 UNITS: 5000 INJECTION INTRAVENOUS; SUBCUTANEOUS at 00:48

## 2019-03-12 RX ADMIN — CYANOCOBALAMIN TAB 1000 MCG 1000 MCG: 1000 TAB at 07:37

## 2019-03-12 RX ADMIN — DOXYCYCLINE 100 MG: 100 INJECTION, POWDER, LYOPHILIZED, FOR SOLUTION INTRAVENOUS at 20:21

## 2019-03-12 RX ADMIN — DOXYCYCLINE 100 MG: 100 INJECTION, POWDER, LYOPHILIZED, FOR SOLUTION INTRAVENOUS at 08:12

## 2019-03-12 NOTE — PHARMACY PATIENT ASSISTANCE
Pharmacy checked on prices of Xarelto and Eliquis per request of Dr. Lisa Beyer. Per insurance, Xarelto has copay of $47 and Eliquis has copay of $47. Pharmacy can place free trial card for either of these medications on patient's profile at [facility]. Informed Dr. Beyer of these copays and cards.     Please contact pharmacy with any questions or changes.    Thank you,  Denisa Medina, Pharmacy Intern  03/12/19  9:20 AM

## 2019-03-12 NOTE — PLAN OF CARE
Problem: Fall Risk (Adult)  Goal: Identify Related Risk Factors and Signs and Symptoms  Outcome: Ongoing (interventions implemented as appropriate)    Goal: Absence of Fall  Outcome: Ongoing (interventions implemented as appropriate)      Problem: Patient Care Overview  Goal: Plan of Care Review  Outcome: Ongoing (interventions implemented as appropriate)    Goal: Discharge Needs Assessment  Outcome: Ongoing (interventions implemented as appropriate)    Goal: Interprofessional Rounds/Family Conf  Outcome: Ongoing (interventions implemented as appropriate)      Problem: Cardiac Output Decreased (Adult)  Goal: Identify Related Risk Factors and Signs and Symptoms  Outcome: Ongoing (interventions implemented as appropriate)    Goal: Effective Tissue Perfusion  Outcome: Ongoing (interventions implemented as appropriate)      Problem: Patient Care Overview  Goal: Individualization and Mutuality  Outcome: Ongoing (interventions implemented as appropriate)    Goal: Discharge Needs Assessment  Outcome: Ongoing (interventions implemented as appropriate)    Goal: Interprofessional Rounds/Family Conf  Outcome: Ongoing (interventions implemented as appropriate)      Problem: Skin Injury Risk (Adult)  Goal: Identify Related Risk Factors and Signs and Symptoms  Outcome: Ongoing (interventions implemented as appropriate)

## 2019-03-12 NOTE — CONSULTS
Cardiology Consult Note  [address]          Patient Identification:  Kayleen Coello      [id number]  87 y.o.        female  11/24/1931       Date of Consultation: 3/12/2019    Reason for Consultation: Atrial fibrillation with rapid ventricular response    PCP: Tono Yang MD  Primary cardiologist: none    History of Present Illness:     Mrs. coello is a pleasant 87-year-old  female with no known history of coronary artery disease or heart failure however she does have history of essential hypertension, COPD, hypothyroidism, end-stage patient states she came to the emergency department after being seen by her primary care provider she was found to be in atrial fibrillation with rapid ventricular response.  Patient states she went to the department to be evaluated for lower extremity edema and erythema.    Patient reports that she is been relatively symptom-free with her biggest complaint is some shortness of breath with exertion.  She states is been going on for a few weeks and has gotten slightly worse she states she only really notices the shortness of breath with moderate walking for long distances or walking up an incline or up the stairs at her house.  She denies any orthopnea or PND.  Her biggest complaint is lower extremity edema with erythema and pain.  She denies any known fevers or recent illnesses.  She denies having been diagnosed with atrial fibrillation in the past.  [id number] score is 4 for age, gender greater than 75, and essential hypertension.  She also denies any recent chest pains dizziness, or syncope.    Past History:  Past Medical History:   Diagnosis Date   • COPD (chronic obstructive pulmonary disease) (CMS/HCC)    • Disease of thyroid gland    • Hypertension    • Osteoporosis      History reviewed. No pertinent surgical history.  Allergies   Allergen Reactions   • Sulfa Antibiotics      Social History     Socioeconomic History   • Marital status:       Spouse name: Not on file   • Number of children: Not on file   • Years of education: Not on file   • Highest education level: Not on file   Social Needs   • Financial resource strain: Not on file   • Food insecurity - worry: Not on file   • Food insecurity - inability: Not on file   • Transportation needs - medical: Not on file   • Transportation needs - non-medical: Not on file   Occupational History   • Not on file   Tobacco Use   • Smoking status: Former Smoker   Substance and Sexual Activity   • Alcohol use: No   • Drug use: No   • Sexual activity: Not on file   Other Topics Concern   • Not on file   Social History Narrative   • Not on file     History reviewed. No pertinent family history.  Medications:  Medications Prior to Admission   Medication Sig Dispense Refill Last Dose   • aspirin 81 MG EC tablet Take 81 mg by mouth Daily.   3/11/2019 at 0730   • atenolol (TENORMIN) 50 MG tablet Take 50 mg by mouth 2 (Two) Times a Day.   3/11/2019 at 0730   • cholecalciferol (VITAMIN D3) 1000 units tablet Take 1,000 Units by mouth Daily.   3/11/2019 at 0730   • vitamin B-12 (CYANOCOBALAMIN) 1000 MCG tablet Take 1,000 mcg by mouth Daily.   3/11/2019 at 0730     Current medications:    aspirin 81 mg Oral Daily   doxycycline 100 mg Intravenous Q12H   furosemide 20 mg Intravenous Q12H   furosemide 40 mg Intravenous Once   lactobacillus acidophilus 1 capsule Oral Daily   sodium chloride 3 mL Intravenous Q12H   vitamin B-12 1,000 mcg Oral Daily     Current IV drips:    diltiaZEM 15 mg/hr Last Rate: 15 mg/hr (03/12/19 7758)   heparin (porcine) 11.1 Units/kg/hr Last Rate: 12 Units/kg/hr (03/12/19 1035)   Pharmacy Consult         Review of Systems:    Constitutional no fever,  no weight loss   Skin no rash   Otolaryngeal no difficulty swallowing   Cardiovascular See HPI   Pulmonary no cough, no sputum production admits to dyspnea on exertion   Gastrointestinal no constipation, no diarrhea   Genitourinary no dysuria, no  "hematuria   Hematologic no easy bruisability, no abnormal bleeding   Musculoskeletal no muscle pain   Neurologic no dizziness, no falls     Physical exam:    /60 (BP Location: Right arm, Patient Position: Lying)   Pulse 112   Temp 98.3 °F (36.8 °C) (Axillary)   Resp 18   Ht 157.5 cm (62\")   Wt 91 kg (200 lb 11.2 oz)   SpO2 98%   BMI 36.71 kg/m²  Body mass index is 36.71 kg/m².   Oxygen saturation   @FLOWAN(10::1)@ SpO2  Min: 91 %  Max: 99 %    General Appearance:   · well developed  · well nourished  HENT:   · oropharynx moist  · lips not cyanotic  Neck:  · thyroid not enlarged  · supple  Respiratory:  · no respiratory distress  · normal breath sounds  ·  rales bibasilar  Cardiovascular:  · no jugular venous distention  · Tachycardic irregularly irregular rhythm  · apical impulse normal  · S1 normal, S2 normal  · no S3, no S4   · no murmur  · no rub, no thrill  · carotid pulses normal; no bruit  · pedal pulses normal  · 2+ pedal edema bilaterally with erythema there is dry and flaky.  Gastrointestinal:   · bowel sounds normal  · non-tender  · no hepatomegaly, no splenomegaly  Musculoskeletal:  · no clubbing of fingers.   · normocephalic, head atraumatic  Skin:   · Warm and erythematous bilateral lower extremities with erythema rising to mid shin level  Neuro/Psychiatric:  · judgement and insight appropriate  · normal mood and affect    Cardiographics:    ECG  (personally reviewed) atrial fibrillation with rapid ventricular response     Telemetry:  (personally reviewed)  Showing atrial fibrillation with rates around 100 110 with frequent spikes to 160 bpm    Lab Review:       Results from last 7 days   Lab Units 03/12/19  0427 03/11/19  1115   WBC 10*3/mm3 12.84* 10.44   HEMOGLOBIN g/dL 14.1 14.1   HEMATOCRIT % 44.9 44.8            Results from last 7 days   Lab Units 03/12/19  0427 03/11/19  1115   SODIUM mmol/L 139 141   BUN mg/dL 10 12   CREATININE mg/dL 0.63 0.62   GLUCOSE mg/dL 105* 101* "      Estimated Creatinine Clearance: 52 mL/min (by C-G formula based on SCr of 0.63 mg/dL).      Results from last 7 days   Lab Units 03/11/19  1733 03/11/19  1115   INR  1.21* 1.18*        Lab Results   Component Value Date    TSH 0.530 03/11/2019        Imaging:     Imaging Results (last 24 hours)     Procedure Component Value Units Date/Time    US Venous Doppler Lower Extremity Bilateral (duplex) [[id number]] Collected:  03/12/19 0940     Updated:  03/12/19 0943    Narrative:       EXAMINATION: US VENOUS DOPPLER LOWER EXTREMITY BILATERAL (DUPLEX)-      CLINICAL INDICATION:     Leg Pain and Swelling  edema and erythema; I48.91-Unspecified atrial fibrillation; I50.9-Heart  failure, unspecified; A15-Uvpzjtr effusion, not elsewhere classified;  L03.119-Cellulitis of unspecified part of limb      TECHNIQUE: Multiplanar gray scale and Doppler vascular sonographic  imaging of the deep veins  without and with compression.      COMPARISON: NONE      FINDINGS: The visualized deep veins demonstrate flow and are  compressible. No evidence of deep venous thrombosis.             Impression:       No DVT.     This report was finalized on 3/12/2019 9:40 AM by Dr. Gregg Stapleton MD.       XR Chest 1 View [[id number]] Collected:  03/11/19 1051     Updated:  03/11/19 1150    Narrative:       EXAMINATION: XR CHEST 1 VW-      CLINICAL INDICATION:     rapid heart rate, edema     TECHNIQUE:  XR CHEST 1 VW-      COMPARISON: NONE      FINDINGS:   The lungs remain aerated.  Heart and mediastinum contours are unremarkable.  SMALL LEFT PLEURAL EFFUSION.  No pneumothorax.   Bony and soft tissue structures are unremarkable.       Impression:       SMALL LEFT PLEURAL EFFUSION.     This report was finalized on 3/11/2019 10:51 AM by Dr. Gregg Stapleton MD.             Assessment:      Atrial fibrillation with RVR (CMS/HCC)    COPD with acute exacerbation (CMS/HCC)    Other specified hypothyroidism    Essential hypertension    Lower extremity  cellulitis        Recommendations:  1. Atrial fibrillation with RVR: Patient currently on 12.5 mg/mL an hour of diltiazem drip.The cost of Eliquis will be $47 patient currently anticoagulated with heparin drip.  Patient's blood pressure has been stable around 125-135 systolic so we will try metoprolol tartrate 25 mg twice daily as patient's heart rate continues to improve decrease on the Cardizem drip.  2. Possible unspecified acute heart failure: Clinically patient does not seem to be in acute heart failure however echo was performed this morning and results are still pending.  During my interview patient was able to lay completely flat for 5 minutes with no change in her breathing status and patient denies any shortness of breath.  Patient has had good response to IV Lasix and is -1900 mL since yesterday.  However will await results of echocardiogram and will tailor therapy accordingly.        I discussed the case with Dr. Zhang where he also reviewed the chart and saw the patient and together came up with a treatment plan.         Abraham Martinez PA-C

## 2019-03-12 NOTE — PROGRESS NOTES
[facility] Medicine Services  PROGRESS NOTE     Patient Identification:  Name:  Kayleen Brasher  Age:  87 y.o.  Sex:  female  :  1931  MRN:  [id number]  Visit Number:  [id number]  Primary Care Provider:  Tono Yang MD    Length of stay:  1    ----------------------------------------------------------------------------------------------------------------------  Subjective     Chief Complaint:  Follow up for new onset atrial fibrillation and bilateral lower extremity cellulitis     History of Presenting Illness/Hospital Course:  Patient is an 88 yo female with past medical history significant for essential hypertension, non-oxygen dependent COPD, hypothyroidism, and advanced age that was admitted on 3/11/2019 for new onset atrial fibrillation with RVR and bilateral lower extremity cellulitis/edema with likely venous stasis dermatitis.  Patient reported a 2-week history of bilateral lower extremity edema and erythema with progressively worsening dyspnea on exertion.  Patient was initially started on a Cardizem drip after a bolus given in the emergency department as well as a dose of digoxin in the emergency department.  Patient was started on a heparin drip for anticoagulation.    Subjective:  Today, the patient is resting in bed per my evaluation this morning.  Patient denies any complaints other than wanting to go home.  No overnight events reported per nursing staff.  Patient remains in atrial fibrillation with intermittent episodes of heart rate in the 150s.  Patient states she is asymptomatic, she states she feels fine.  She denies any chest pain or palpitations at this time.  She has been on bedrest, therefore she does not complain of any dyspnea on exertion.  Currently, she denies any dyspnea.  She denies any cough.  She denies any abdominal pain, nausea, vomiting or diarrhea.  No fevers or chills reported.  She denies any urinary symptoms.    Present  during exam: Aubrey EDMOND, nursing student, and patient's granddaughter  ----------------------------------------------------------------------------------------------------------------------  Objective     Consults:  · Cardiology     Procedures:  · None     Drips:  · Cardizem gtt   · Heparin gtt     Current [facility] Meds:    aspirin 81 mg Oral Daily   doxycycline 100 mg Intravenous Q12H   furosemide 20 mg Intravenous Q12H   furosemide 40 mg Intravenous Once   lactobacillus acidophilus 1 capsule Oral Daily   sodium chloride 3 mL Intravenous Q12H   vitamin B-12 1,000 mcg Oral Daily       diltiaZEM 15 mg/hr Last Rate: 15 mg/hr (03/12/19 0348)   heparin (porcine) 11.1 Units/kg/hr Last Rate: 15.1 Units/kg/hr (03/12/19 0049)   Pharmacy Consult     Pharmacy to dose vancomycin       ----------------------------------------------------------------------------------------------------------------------  Vital Signs:  Temp:  [96.5 °F (35.8 °C)-98.4 °F (36.9 °C)] 98.4 °F (36.9 °C)  Heart Rate:  [] 109  Resp:  [18-20] 18  BP: ()/() 156/61  Mean Arterial Pressure (Non-Invasive) for the past 24 hrs (Last 3 readings):   Noninvasive MAP (mmHg)   03/12/19 0338 84   03/11/19 1613 104   03/11/19 1551 91     SpO2 Percentage    03/11/19 1854 03/12/19 0338 03/12/19 0619   SpO2: 91% 98% 98%     SpO2:  [87 %-99 %] 98 %  on   ;   Device (Oxygen Therapy): nasal cannula    Body mass index is 36.71 kg/m².  Wt Readings from Last 3 Encounters:   03/12/19 91 kg (200 lb 11.2 oz)   11/26/16 90.7 kg (200 lb)        Intake/Output Summary (Last 24 hours) at 3/12/2019 0726  Last data filed at 3/12/2019 0338  Gross per 24 hour   Intake 700 ml   Output 2600 ml   Net -1900 ml     Diet Regular; Cardiac  ----------------------------------------------------------------------------------------------------------------------  Physical exam:  Physical Exam   Constitutional: She is oriented to person, place, and time. She appears well-developed and  well-nourished.  Non-toxic appearance. No distress.   Does not, elderly   HENT:   Head: Normocephalic and atraumatic.   Right Ear: External ear normal.   Left Ear: External ear normal.   Nose: Nose normal.   Mouth/Throat: Oropharynx is clear and moist and mucous membranes are normal. No oropharyngeal exudate.   Eyes: Conjunctivae and EOM are normal. Pupils are equal, round, and reactive to light. No scleral icterus.   Neck: Trachea normal and normal range of motion. Neck supple. No JVD present. No muscular tenderness present. Carotid bruit is not present. No tracheal deviation present. No thyromegaly present.   Cardiovascular: Normal heart sounds and intact distal pulses. An irregularly irregular rhythm present. Tachycardia present. Exam reveals no gallop and no friction rub.   No murmur heard.  Pulmonary/Chest: Effort normal. No respiratory distress. She has decreased breath sounds (slightly ) in the right lower field and the left lower field. She has no wheezes. She has no rhonchi. She has no rales. She exhibits no tenderness.   Abdominal: Soft. Bowel sounds are normal. She exhibits no distension and no mass. There is no hepatosplenomegaly. There is no tenderness. There is no rebound and no guarding. No hernia.   Genitourinary:   Genitourinary Comments: Schafer in place    Musculoskeletal: She exhibits no deformity.        Right lower leg: She exhibits tenderness and edema (1-2+ Bilateral lower extremities from above ankle to right below the knee).        Left lower leg: She exhibits tenderness and edema (1-2+ Bilateral lower extremities from above ankle to right below the knee).     Vascular Status -  Her right foot exhibits abnormal foot edema. Her right foot exhibits normal foot vasculature . Her left foot exhibits abnormal foot edema. Her left foot exhibits normal foot vasculature .  Neurological: She is alert and oriented to person, place, and time. She has normal strength. No cranial nerve deficit or sensory  deficit.   Skin: Skin is warm and dry. Capillary refill takes less than 2 seconds. No rash noted. There is erythema (Bilateral lower extremities from above ankle to right below the knee). No pallor.   Psychiatric: She has a normal mood and affect. Her speech is normal and behavior is normal. Judgment and thought content normal.   Nursing note and vitals reviewed.     ----------------------------------------------------------------------------------------------------------------------  Tele:    Atrial fibrillation 100s-150s    I have personally reviewed the EKG/Telemetry strips   ----------------------------------------------------------------------------------------------------------------------  Results from last 7 days   Lab Units 03/12/19 0427 03/11/19  2219 03/11/19  1733   CK TOTAL U/L 28 31 29   CKMB ng/mL 1.03 1.06 1.16   TROPONIN T ng/mL <0.010 <0.010 <0.010   MYOGLOBIN ng/mL 36.8 42.1 48.7     Results from last 7 days   Lab Units 03/11/19  1115   PROBNP pg/mL 1,955.0*         Results from last 7 days   Lab Units 03/12/19 0427 03/11/19  1824 03/11/19  1733 03/11/19  1115   CRP mg/dL  --  0.40  --   --    LACTATE mmol/L  --   --   --  1.7   WBC 10*3/mm3 12.84*  --   --  10.44   HEMOGLOBIN g/dL 14.1  --   --  14.1   HEMATOCRIT % 44.9  --   --  44.8   MCV fL 98.9*  --   --  98.7*   MCHC g/dL 31.4*  --   --  31.5   PLATELETS 10*3/mm3 204  --   --  190   INR   --   --  1.21* 1.18*     Results from last 7 days   Lab Units 03/12/19  0427 03/11/19  1733 03/11/19  1115   SODIUM mmol/L 139  --  141   POTASSIUM mmol/L 4.8  --  3.6   MAGNESIUM mg/dL 2.0 1.8  --    CHLORIDE mmol/L 105  --  105   CO2 mmol/L 26.3  --  25.1   BUN mg/dL 10  --  12   CREATININE mg/dL 0.63  --  0.62   EGFR IF NONAFRICN AM mL/min/1.73 89  --  91   CALCIUM mg/dL 8.3*  --  9.0   GLUCOSE mg/dL 105*  --  101*   ALBUMIN g/dL 2.66*  --  2.73*   BILIRUBIN mg/dL 0.7  --  0.8   ALK PHOS U/L 94  --  101   AST (SGOT) U/L 28  --  28   ALT (SGPT) U/L 11   --  12   Estimated Creatinine Clearance: 52 mL/min (by C-G formula based on SCr of 0.63 mg/dL).    Lab Results   Component Value Date    TSH 0.530 03/11/2019     Blood Culture   Date Value Ref Range Status   03/11/2019 No growth at less than 24 hours  Preliminary   03/11/2019 No growth at less than 24 hours  Preliminary     I have personally reviewed the above laboratory results.   ----------------------------------------------------------------------------------------------------------------------  Imaging Results (last 24 hours)     Procedure Component Value Units Date/Time    XR Chest 1 View [[id number]] Collected:  03/11/19 1051     Updated:  03/11/19 1150    Narrative:       FINDINGS:   The lungs remain aerated.  Heart and mediastinum contours are unremarkable.  SMALL LEFT PLEURAL EFFUSION.  No pneumothorax.   Bony and soft tissue structures are unremarkable.    Impression:       SMALL LEFT PLEURAL EFFUSION.     This report was finalized on 3/11/2019 10:51 AM by Dr. Gregg Stapleton MD.      I have personally reviewed the above radiology results.   ----------------------------------------------------------------------------------------------------------------------  Assessment/Plan     · New onset atrial fibrillation with RVR: Patient remains on Cardizem gtt at 15 mg/hr and HR remains tachycardic 100s-150s. Cardiology has evaluated the patient in consultation today, recommending to start 25 mg PO Metoprolol BID, this has been ordered. Cont Cardizem gtt with goal of titrating down as tolerated. Patient may need digoxin, dose given in ED on admission helped significantly. Heparin gtt in place, will discontinue and change to Eliquis 5 mg BID for stroke prevention, price has been checked by pharmacy. Cont telemetry monitoring. ECHO pending as well as bilateral lower extremity doppler ultrasound. Cardiology input/assistance is much appreciated. TSH WNL.   · Bilateral lower extremity edema and cellulitis with likely  venous stasis dermatitis: Blood cultures NGTD. WBC slightly elevated this AM. CRP and lactic negative. Afebrile, hemodynamically stable. Pt was given IV abx in ED, cont current empiric treatment with IV doxycycline. If were to worse, consider IV vancomycin. Doppler of bilateral extremities pending. Echo pending.  · Elevated pro-BNP: Could be due to afib RVR vs new CHF. Pt does have edema in lower extremities. ECHO pending. She is getting 20 mg IV lasix BID, diuresing well. Cont, closely monitor Strict Is and Os and daily weights. Await ECHO results. .   · COPD without acute exacerbation: Supplemental O2 as needed to titrate SpO2 > 90%. PRN nebs available.   · Hypothyroidism: Documented history of hypothyroidism, patient is not on any thyroid hormone replacement therapy.  TSH WNL.  Cont off of medications.   · Essential hypertension: BP controlled. Pt on atenolol at home, will hold this for now. Pt on Cardizem gtt, starting PO Metoprolol BID today. Closely monitor blood pressure per hospital protocol and titrate medications as necessary.  · Macrocytosis, without anemia: Vitamin B12 and folate WNL. Vitamin D level greater than 120, discontinue home supplementation of 1000 units/day.   · Hypoalbuminemia   · Former smoker   · F/E/N: no IV fluids. Replace electrolytes per protocol as necessary. Cardiac diet.  --------------------------------------------------  DVT Prophylaxis: Heparin gtt   GI Prophylaxis: Pepcid   Activity: Bed rest     The patient is high risk due to the following diagnoses/reasons:  New onset Afib RVR, elevated BNP, lower extremity cellulitis bilateral, advanced age, COPD, former smoker     I have discussed the patient's assessment and plan with the patient, Aubrey ISSA RN, and granddaughter present at bedside     Disposition: Remains hospitalized for new onset atrial fibrillation with RVR      Alejandra Mcpherson PA-C  Hospitalist Service -- [facility]   Pager: [phone]    03/12/19  7:27  LUIS MANUEL    Attending Physician: Lisa Beyer DO    ----------------------------------------------------------------------------------------------------------------------

## 2019-03-12 NOTE — PLAN OF CARE
Problem: Fall Risk (Adult)  Goal: Identify Related Risk Factors and Signs and Symptoms  Outcome: Ongoing (interventions implemented as appropriate)    Goal: Absence of Fall  Outcome: Ongoing (interventions implemented as appropriate)      Problem: Patient Care Overview  Goal: Plan of Care Review  Outcome: Ongoing (interventions implemented as appropriate)    Goal: Individualization and Mutuality  Outcome: Ongoing (interventions implemented as appropriate)    Goal: Discharge Needs Assessment  Outcome: Ongoing (interventions implemented as appropriate)    Goal: Interprofessional Rounds/Family Conf  Outcome: Ongoing (interventions implemented as appropriate)      Problem: Cardiac Output Decreased (Adult)  Goal: Identify Related Risk Factors and Signs and Symptoms  Outcome: Ongoing (interventions implemented as appropriate)    Goal: Effective Tissue Perfusion  Outcome: Ongoing (interventions implemented as appropriate)      Problem: Patient Care Overview  Goal: Plan of Care Review  Outcome: Ongoing (interventions implemented as appropriate)    Goal: Individualization and Mutuality  Outcome: Ongoing (interventions implemented as appropriate)    Goal: Discharge Needs Assessment  Outcome: Ongoing (interventions implemented as appropriate)    Goal: Interprofessional Rounds/Family Conf  Outcome: Ongoing (interventions implemented as appropriate)      Problem: Skin Injury Risk (Adult)  Goal: Identify Related Risk Factors and Signs and Symptoms  Outcome: Ongoing (interventions implemented as appropriate)    Goal: Skin Health and Integrity  Outcome: Ongoing (interventions implemented as appropriate)

## 2019-03-12 NOTE — PLAN OF CARE
Problem: Fall Risk (Adult)  Goal: Identify Related Risk Factors and Signs and Symptoms  Outcome: Ongoing (interventions implemented as appropriate)    Goal: Absence of Fall  Outcome: Ongoing (interventions implemented as appropriate)      Problem: Patient Care Overview  Goal: Plan of Care Review  Outcome: Ongoing (interventions implemented as appropriate)    Goal: Individualization and Mutuality  Outcome: Ongoing (interventions implemented as appropriate)    Goal: Discharge Needs Assessment  Outcome: Ongoing (interventions implemented as appropriate)    Goal: Interprofessional Rounds/Family Conf  Outcome: Ongoing (interventions implemented as appropriate)      Problem: Cardiac Output Decreased (Adult)  Goal: Identify Related Risk Factors and Signs and Symptoms  Outcome: Ongoing (interventions implemented as appropriate)    Goal: Effective Tissue Perfusion  Outcome: Ongoing (interventions implemented as appropriate)      Problem: Patient Care Overview  Goal: Plan of Care Review  Outcome: Ongoing (interventions implemented as appropriate)

## 2019-03-12 NOTE — PROGRESS NOTES
Discharge Planning Assessment   Vamshi     Patient Name: Kayleen Brasher  MRN: [id number]  Today's Date: 3/12/2019    Admit Date: 3/11/2019    Discharge Needs Assessment     Row Name 03/12/19 1316       Living Environment    Lives With  spouse    Current Living Arrangements  home/apartment/condo    Primary Care Provided by  spouse/significant other    Provides Primary Care For  spouse        Discharge Plan     Row Name 03/12/19 1317       Plan    Plan  Pt admitted on 3/11/19.  SS received consult per Case Management for discharge planning.  SS spoke with pt on this date.  Pt lives at home with spouse and plans to return home at discharge.  Pt currently does not utilize home health services.  Pt currently utilizes cane and walker via unknown provider.  Pt utlizes [facility] in [place].  Pt's PCP is Dr. Yang.  SS will follow and assist with discharge needs.           Zahraa Herrera

## 2019-03-13 ENCOUNTER — ANESTHESIA (OUTPATIENT)
Dept: CARDIOLOGY | Facility: HOSPITAL | Age: 84
End: 2019-03-13

## 2019-03-13 ENCOUNTER — APPOINTMENT (OUTPATIENT)
Dept: CARDIOLOGY | Facility: HOSPITAL | Age: 84
End: 2019-03-13

## 2019-03-13 ENCOUNTER — ANESTHESIA EVENT (OUTPATIENT)
Dept: CARDIOLOGY | Facility: HOSPITAL | Age: 84
End: 2019-03-13

## 2019-03-13 LAB
ALBUMIN SERPL-MCNC: 2.67 G/DL (ref 3.5–5.2)
ALBUMIN/GLOB SERPL: 0.7 G/DL
ALP SERPL-CCNC: 89 U/L (ref 39–117)
ALT SERPL W P-5'-P-CCNC: 6 U/L (ref 1–33)
ANION GAP SERPL CALCULATED.3IONS-SCNC: 6.6 MMOL/L
AST SERPL-CCNC: 22 U/L (ref 1–32)
BASOPHILS # BLD AUTO: 0.03 10*3/MM3 (ref 0–0.2)
BASOPHILS NFR BLD AUTO: 0.3 % (ref 0–1.5)
BILIRUB SERPL-MCNC: 0.5 MG/DL (ref 0.1–1.2)
BUN BLD-MCNC: 9 MG/DL (ref 8–23)
BUN/CREAT SERPL: 14.8 (ref 7–25)
CALCIUM SPEC-SCNC: 8.6 MG/DL (ref 8.6–10.5)
CHLORIDE SERPL-SCNC: 102 MMOL/L (ref 98–107)
CO2 SERPL-SCNC: 31.4 MMOL/L (ref 22–29)
CREAT BLD-MCNC: 0.61 MG/DL (ref 0.57–1)
DEPRECATED RDW RBC AUTO: 53.9 FL (ref 37–54)
EOSINOPHIL # BLD AUTO: 0.49 10*3/MM3 (ref 0–0.4)
EOSINOPHIL NFR BLD AUTO: 4.9 % (ref 0.3–6.2)
ERYTHROCYTE [DISTWIDTH] IN BLOOD BY AUTOMATED COUNT: 15.2 % (ref 12.3–15.4)
GFR SERPL CREATININE-BSD FRML MDRD: 93 ML/MIN/1.73
GLOBULIN UR ELPH-MCNC: 3.9 GM/DL
GLUCOSE BLD-MCNC: 110 MG/DL (ref 65–99)
HCT VFR BLD AUTO: 43.4 % (ref 34–46.6)
HGB BLD-MCNC: 13.4 G/DL (ref 12–15.9)
IMM GRANULOCYTES # BLD AUTO: 0.02 10*3/MM3 (ref 0–0.05)
IMM GRANULOCYTES NFR BLD AUTO: 0.2 % (ref 0–0.5)
LYMPHOCYTES # BLD AUTO: 2.75 10*3/MM3 (ref 0.7–3.1)
LYMPHOCYTES NFR BLD AUTO: 27.4 % (ref 19.6–45.3)
MAGNESIUM SERPL-MCNC: 2 MG/DL (ref 1.6–2.4)
MCH RBC QN AUTO: 30.7 PG (ref 26.6–33)
MCHC RBC AUTO-ENTMCNC: 30.9 G/DL (ref 31.5–35.7)
MCV RBC AUTO: 99.3 FL (ref 79–97)
MONOCYTES # BLD AUTO: 1.25 10*3/MM3 (ref 0.1–0.9)
MONOCYTES NFR BLD AUTO: 12.5 % (ref 5–12)
NEUTROPHILS # BLD AUTO: 5.48 10*3/MM3 (ref 1.4–7)
NEUTROPHILS NFR BLD AUTO: 54.7 % (ref 42.7–76)
NT-PROBNP SERPL-MCNC: 4334 PG/ML (ref 0–1800)
PHOSPHATE SERPL-MCNC: 2.9 MG/DL (ref 2.5–4.5)
PLATELET # BLD AUTO: 190 10*3/MM3 (ref 140–450)
PMV BLD AUTO: 11 FL (ref 6–12)
POTASSIUM BLD-SCNC: 4.3 MMOL/L (ref 3.5–5.2)
PROT SERPL-MCNC: 6.6 G/DL (ref 6–8.5)
RBC # BLD AUTO: 4.37 10*6/MM3 (ref 3.77–5.28)
SODIUM BLD-SCNC: 140 MMOL/L (ref 136–145)
WBC NRBC COR # BLD: 10.02 10*3/MM3 (ref 3.4–10.8)

## 2019-03-13 PROCEDURE — 25010000002 PROPOFOL 1000 MG/ML EMULSION: Performed by: NURSE ANESTHETIST, CERTIFIED REGISTERED

## 2019-03-13 PROCEDURE — 80053 COMPREHEN METABOLIC PANEL: CPT | Performed by: PHYSICIAN ASSISTANT

## 2019-03-13 PROCEDURE — 99233 SBSQ HOSP IP/OBS HIGH 50: CPT | Performed by: PHYSICIAN ASSISTANT

## 2019-03-13 PROCEDURE — 83880 ASSAY OF NATRIURETIC PEPTIDE: CPT | Performed by: PHYSICIAN ASSISTANT

## 2019-03-13 PROCEDURE — 25010000002 PROPOFOL 10 MG/ML EMULSION: Performed by: NURSE ANESTHETIST, CERTIFIED REGISTERED

## 2019-03-13 PROCEDURE — 25010000002 FUROSEMIDE PER 20 MG: Performed by: INTERNAL MEDICINE

## 2019-03-13 PROCEDURE — 85025 COMPLETE CBC W/AUTO DIFF WBC: CPT | Performed by: PHYSICIAN ASSISTANT

## 2019-03-13 PROCEDURE — 94799 UNLISTED PULMONARY SVC/PX: CPT

## 2019-03-13 PROCEDURE — 99232 SBSQ HOSP IP/OBS MODERATE 35: CPT | Performed by: INTERNAL MEDICINE

## 2019-03-13 PROCEDURE — 92960 CARDIOVERSION ELECTRIC EXT: CPT | Performed by: INTERNAL MEDICINE

## 2019-03-13 PROCEDURE — 93005 ELECTROCARDIOGRAM TRACING: CPT | Performed by: PHYSICIAN ASSISTANT

## 2019-03-13 PROCEDURE — 25010000002 AMIODARONE PER 30 MG: Performed by: INTERNAL MEDICINE

## 2019-03-13 PROCEDURE — 83735 ASSAY OF MAGNESIUM: CPT | Performed by: PHYSICIAN ASSISTANT

## 2019-03-13 PROCEDURE — 25010000002 AMIODARONE IN DEXTROSE 5% 360-4.14 MG/200ML-% SOLUTION: Performed by: INTERNAL MEDICINE

## 2019-03-13 PROCEDURE — 5A2204Z RESTORATION OF CARDIAC RHYTHM, SINGLE: ICD-10-PCS | Performed by: INTERNAL MEDICINE

## 2019-03-13 PROCEDURE — 93325 DOPPLER ECHO COLOR FLOW MAPG: CPT

## 2019-03-13 PROCEDURE — 93321 DOPPLER ECHO F-UP/LMTD STD: CPT

## 2019-03-13 PROCEDURE — 93010 ELECTROCARDIOGRAM REPORT: CPT | Performed by: INTERNAL MEDICINE

## 2019-03-13 PROCEDURE — 84100 ASSAY OF PHOSPHORUS: CPT | Performed by: PHYSICIAN ASSISTANT

## 2019-03-13 PROCEDURE — 92960 CARDIOVERSION ELECTRIC EXT: CPT

## 2019-03-13 PROCEDURE — 25010000002 FENTANYL CITRATE (PF) 100 MCG/2ML SOLUTION: Performed by: NURSE ANESTHETIST, CERTIFIED REGISTERED

## 2019-03-13 PROCEDURE — 93312 ECHO TRANSESOPHAGEAL: CPT

## 2019-03-13 PROCEDURE — 93312 ECHO TRANSESOPHAGEAL: CPT | Performed by: INTERNAL MEDICINE

## 2019-03-13 PROCEDURE — 93321 DOPPLER ECHO F-UP/LMTD STD: CPT | Performed by: INTERNAL MEDICINE

## 2019-03-13 PROCEDURE — 93005 ELECTROCARDIOGRAM TRACING: CPT | Performed by: INTERNAL MEDICINE

## 2019-03-13 PROCEDURE — 93325 DOPPLER ECHO COLOR FLOW MAPG: CPT | Performed by: INTERNAL MEDICINE

## 2019-03-13 RX ORDER — DOXYCYCLINE 100 MG/1
100 CAPSULE ORAL EVERY 12 HOURS SCHEDULED
Status: DISCONTINUED | OUTPATIENT
Start: 2019-03-13 | End: 2019-03-13

## 2019-03-13 RX ORDER — AMIODARONE HYDROCHLORIDE 200 MG/1
200 TABLET ORAL ONCE
Status: COMPLETED | OUTPATIENT
Start: 2019-03-14 | End: 2019-03-14

## 2019-03-13 RX ORDER — FUROSEMIDE 10 MG/ML
20 INJECTION INTRAMUSCULAR; INTRAVENOUS ONCE
Status: COMPLETED | OUTPATIENT
Start: 2019-03-13 | End: 2019-03-13

## 2019-03-13 RX ORDER — AMIODARONE HYDROCHLORIDE 200 MG/1
200 TABLET ORAL EVERY 12 HOURS
Status: DISCONTINUED | OUTPATIENT
Start: 2019-03-21 | End: 2019-03-15 | Stop reason: HOSPADM

## 2019-03-13 RX ORDER — FENTANYL CITRATE 50 UG/ML
INJECTION, SOLUTION INTRAMUSCULAR; INTRAVENOUS AS NEEDED
Status: DISCONTINUED | OUTPATIENT
Start: 2019-03-13 | End: 2019-03-13 | Stop reason: SURG

## 2019-03-13 RX ORDER — AMIODARONE HYDROCHLORIDE 200 MG/1
200 TABLET ORAL DAILY
Status: DISCONTINUED | OUTPATIENT
Start: 2019-04-04 | End: 2019-03-15 | Stop reason: HOSPADM

## 2019-03-13 RX ORDER — FUROSEMIDE 20 MG/1
20 TABLET ORAL
Status: DISCONTINUED | OUTPATIENT
Start: 2019-03-13 | End: 2019-03-14

## 2019-03-13 RX ORDER — AMIODARONE HYDROCHLORIDE 200 MG/1
200 TABLET ORAL EVERY 8 HOURS
Status: DISCONTINUED | OUTPATIENT
Start: 2019-03-14 | End: 2019-03-15 | Stop reason: HOSPADM

## 2019-03-13 RX ORDER — LIDOCAINE HYDROCHLORIDE 20 MG/ML
INJECTION, SOLUTION EPIDURAL; INFILTRATION; INTRACAUDAL; PERINEURAL AS NEEDED
Status: DISCONTINUED | OUTPATIENT
Start: 2019-03-13 | End: 2019-03-13 | Stop reason: SURG

## 2019-03-13 RX ORDER — IBUPROFEN 400 MG/1
400 TABLET ORAL EVERY 6 HOURS PRN
Status: DISCONTINUED | OUTPATIENT
Start: 2019-03-13 | End: 2019-03-13

## 2019-03-13 RX ORDER — PROPOFOL 10 MG/ML
VIAL (ML) INTRAVENOUS AS NEEDED
Status: DISCONTINUED | OUTPATIENT
Start: 2019-03-13 | End: 2019-03-13 | Stop reason: SURG

## 2019-03-13 RX ADMIN — AMIODARONE HYDROCHLORIDE 150 MG: 50 INJECTION, SOLUTION INTRAVENOUS at 11:35

## 2019-03-13 RX ADMIN — DILTIAZEM HYDROCHLORIDE 30 MG: 30 TABLET, FILM COATED ORAL at 20:28

## 2019-03-13 RX ADMIN — SODIUM CHLORIDE, PRESERVATIVE FREE 3 ML: 5 INJECTION INTRAVENOUS at 20:29

## 2019-03-13 RX ADMIN — IBUPROFEN 400 MG: 400 TABLET, FILM COATED ORAL at 01:25

## 2019-03-13 RX ADMIN — Medication 1 CAPSULE: at 11:34

## 2019-03-13 RX ADMIN — METOPROLOL TARTRATE 25 MG: 25 TABLET, FILM COATED ORAL at 20:29

## 2019-03-13 RX ADMIN — FUROSEMIDE 20 MG: 20 TABLET ORAL at 17:24

## 2019-03-13 RX ADMIN — DOXYCYCLINE 100 MG: 100 INJECTION, POWDER, LYOPHILIZED, FOR SOLUTION INTRAVENOUS at 20:28

## 2019-03-13 RX ADMIN — AMIODARONE HYDROCHLORIDE 1 MG/MIN: 1.8 INJECTION, SOLUTION INTRAVENOUS at 11:48

## 2019-03-13 RX ADMIN — DILTIAZEM HYDROCHLORIDE 30 MG: 30 TABLET, FILM COATED ORAL at 01:24

## 2019-03-13 RX ADMIN — AMIODARONE HYDROCHLORIDE 0.5 MG/MIN: 1.8 INJECTION, SOLUTION INTRAVENOUS at 17:27

## 2019-03-13 RX ADMIN — APIXABAN 5 MG: 5 TABLET, FILM COATED ORAL at 11:33

## 2019-03-13 RX ADMIN — FENTANYL CITRATE 100 MCG: 50 INJECTION INTRAMUSCULAR; INTRAVENOUS at 09:22

## 2019-03-13 RX ADMIN — PROPOFOL 30 MG: 10 INJECTION, EMULSION INTRAVENOUS at 09:23

## 2019-03-13 RX ADMIN — FUROSEMIDE 20 MG: 10 INJECTION, SOLUTION INTRAMUSCULAR; INTRAVENOUS at 17:24

## 2019-03-13 RX ADMIN — APIXABAN 5 MG: 5 TABLET, FILM COATED ORAL at 20:29

## 2019-03-13 RX ADMIN — FUROSEMIDE 20 MG: 20 TABLET ORAL at 11:34

## 2019-03-13 RX ADMIN — DILTIAZEM HYDROCHLORIDE 30 MG: 30 TABLET, FILM COATED ORAL at 11:34

## 2019-03-13 RX ADMIN — PROPOFOL 140 MCG/KG/MIN: 10 INJECTION, EMULSION INTRAVENOUS at 09:23

## 2019-03-13 RX ADMIN — LIDOCAINE HYDROCHLORIDE 3 ML: 20 INJECTION, SOLUTION EPIDURAL; INFILTRATION; INTRACAUDAL; PERINEURAL at 09:22

## 2019-03-13 NOTE — PROGRESS NOTES
Post procedure note    Transesophageal echo performed    Monitored anesthesia care    No complications observed    Preliminary findings:  No intracardiac thrombus  Severe left atrial enlargement  Normal biventricular systolic function ejection fraction 65%  Mild concentric LVH  Mitral annular and posterior leaflet calcification, no mitral stenosis or significant regurgitation  Sclerotic aortic valve with normal function  Normal-appearing tricuspid valve  Negative bubble study  No effusion  Mild aortic atheromata      Plan:    Proceed with synchronized cardioversion    Judson Zhang MD

## 2019-03-13 NOTE — PLAN OF CARE
Problem: Fall Risk (Adult)  Goal: Identify Related Risk Factors and Signs and Symptoms  Outcome: Ongoing (interventions implemented as appropriate)   03/12/19 0834   Fall Risk (Adult)   Related Risk Factors (Fall Risk) bladder function altered       Problem: Cardiac Output Decreased (Adult)  Goal: Effective Tissue Perfusion  Outcome: Ongoing (interventions implemented as appropriate)   03/13/19 0939   Cardiac Output Decreased (Adult)   Effective Tissue Perfusion making progress toward outcome       Problem: Patient Care Overview  Goal: Discharge Needs Assessment  Outcome: Ongoing (interventions implemented as appropriate)   03/11/19 1600 03/12/19 0834   Discharge Needs Assessment   Readmission Within the Last 30 Days --  no previous admission in last 30 days   Patient/Family Anticipates Transition to home with family --    Patient/Family Anticipated Services at Transition  --    Transportation Anticipated family or friend will provide --    Disability   Equipment Currently Used at Home walker, standard;cane, straight --        Problem: Skin Injury Risk (Adult)  Goal: Skin Health and Integrity  Outcome: Ongoing (interventions implemented as appropriate)   03/13/19 0939   Skin Injury Risk (Adult)   Skin Health and Integrity making progress toward outcome

## 2019-03-13 NOTE — ANESTHESIA PREPROCEDURE EVALUATION
Anesthesia Evaluation     Patient summary reviewed   no history of anesthetic complications:  NPO Solid Status: > 8 hours  NPO Liquid Status: > 8 hours           Airway   Mallampati: II  TM distance: >3 FB  Neck ROM: limited  No difficulty expected  Dental    (+) edentulous    Pulmonary - normal exam   (+) COPD,   Cardiovascular     Rhythm: irregular  Rate: abnormal    (+) hypertension, dysrhythmias Atrial Fib,       Neuro/Psych  GI/Hepatic/Renal/Endo    (+)   hypothyroidism,     Musculoskeletal     Abdominal  - normal exam   Substance History      OB/GYN          Other                        Anesthesia Plan    ASA 3     general     intravenous induction

## 2019-03-13 NOTE — NURSING NOTE
Pt A&O, removed IVs, took tele monitor off, refusing to have another IV. Refusing nursing care.  Lead nurse in room. Dr. Novak notified. New orders for PO meds obtained. Respecting pt's wishes by leaving IV out at this time. Pt educated on dangers of not having IV access and the need for the telemetry monitor. Pt verbalized understanding. Agreeable to wearing tele monitor.  Daughter and spouse notified, states they will be coming to sit with pt.

## 2019-03-13 NOTE — NURSING NOTE
Peripheral IV has been placed, see LDA. Patient states that she is agreeable to have the TODD with cardioversion today. Current heart rate is Atrial fibrillation 110-120 BPM.  Daughter at bedside. No distress noted. Will continue to monitor.

## 2019-03-13 NOTE — PROCEDURES
Procedure note    Synchronized cardioversion    Indication: Persistent symptomatic atrial fibrillation    Complications: None    Description: The patient was fasting, brought to the procedural room, monitored anesthesia care was used for sedation.  Transesophageal echo was performed, see separate report.  Once the sedation was adequate for cardioversion, synchronized 150 J cardioversion was performed converting the patient from atrial fibrillation back to sinus rhythm with rates in the high 70s.  There were no complications observed.  I brief the family of the findings after the procedure.  Sedation was administered by the anesthesiologist      Conclusions: Successful synchronized cardioversion    Plan: The patient will be transferred back to her room, she will be loaded with amiodarone to help maintain sinus rhythm and continue anticoagulation.    Judson Zhang MD

## 2019-03-13 NOTE — PROGRESS NOTES
[facility] Medicine Services  PROGRESS NOTE     Patient Identification:  Name:  Kayleen Brasher  Age:  87 y.o.  Sex:  female  :  1931  MRN:  [id number]  Visit Number:  [id number]  Primary Care Provider:  Tono Yang MD    Length of stay:  2    ----------------------------------------------------------------------------------------------------------------------  Subjective     Chief Complaint:  Follow up for new onset atrial fibrillation and bilateral lower extremity cellulitis     History of Presenting Illness/Hospital Course:  Patient is an 88 yo female with past medical history significant for essential hypertension, non-oxygen dependent COPD, hypothyroidism, and advanced age that was admitted on 3/11/2019 for new onset atrial fibrillation with RVR and bilateral lower extremity cellulitis/edema with likely venous stasis dermatitis.  Patient reported a 2-week history of bilateral lower extremity edema and erythema with progressively worsening dyspnea on exertion.  Patient was initially started on a Cardizem drip after a bolus given in the emergency department as well as a dose of digoxin in the emergency department.  Patient was started on a heparin drip for anticoagulation. Patient has transitioned to PO Eliquis. Patient underwent TODD cardioversion earlier this AM per cardiology. Patient has since been started on Amiodarone gtt. Patient on PO Cardizem and Metoprolol.    Subjective:  Today, the patient was resting in bed per my evaluation this morning with  and granddaughter present at bedside after TODD. Patient states that she feels tired, otherwise is with no new complaints. Overnight she did pull out IVs, IV had to be replaced this AM. Patient denies any chest pain or shortness of breath. Denies any palpitations. No shortness of breath. Denies any abdominal pain. She did have some mild nausea after procedure, no emesis. Has dixon catheter in  place.       Present during exam: mark Rivas, April RN  ----------------------------------------------------------------------------------------------------------------------  Objective     Consults:  · Cardiology     Procedures:  · 3/11/2019: Schafer catheter insertion   · 3/13/2019: TODD with synchronized cardioversion -- Dr. Zhang, cardiology     Drips:  · Amiodarone gtt     Current Hospital Meds:    [START ON 3/14/2019] amiodarone 200 mg Oral Once   Followed by      [START ON 3/14/2019] amiodarone 200 mg Oral Q8H   Followed by      [START ON 3/21/2019] amiodarone 200 mg Oral Q12H   Followed by      [START ON 4/4/2019] amiodarone 200 mg Oral Daily   apixaban 5 mg Oral Q12H   aspirin 81 mg Oral Daily   diltiaZEM 30 mg Oral Q12H   doxycycline 100 mg Intravenous Q12H   furosemide 20 mg Oral BID   lactobacillus acidophilus 1 capsule Oral Daily   metoprolol tartrate 25 mg Oral Q12H   sodium chloride 3 mL Intravenous Q12H   vitamin B-12 1,000 mcg Oral Daily       amiodarone 1 mg/min   Followed by    amiodarone 0.5 mg/min   Pharmacy Consult      ----------------------------------------------------------------------------------------------------------------------  Vital Signs:  Temp:  [97.5 °F (36.4 °C)-97.7 °F (36.5 °C)] 97.6 °F (36.4 °C)  Heart Rate:  [] 87  Resp:  [18-20] 18  BP: (110-157)/() 129/59  Mean Arterial Pressure (Non-Invasive) for the past 24 hrs (Last 3 readings):   Noninvasive MAP (mmHg)   03/13/19 1135 80   03/13/19 1053 93   03/13/19 1050 94     SpO2 Percentage    03/13/19 1135 03/13/19 1140 03/13/19 1145   SpO2: 99% 99% 99%     SpO2:  [90 %-100 %] 99 %  on  Flow (L/min):  [2-3] 3;   Device (Oxygen Therapy): nasal cannula    Body mass index is 35.9 kg/m².  Wt Readings from Last 3 Encounters:   03/13/19 89 kg (196 lb 4.8 oz)   11/26/16 90.7 kg (200 lb)        Intake/Output Summary (Last 24 hours) at 3/13/2019 1147  Last data filed at 3/13/2019 0250  Gross per 24 hour   Intake 580 ml    Output 1100 ml   Net -520 ml     Diet Regular; Cardiac  ----------------------------------------------------------------------------------------------------------------------  Physical exam:  Physical Exam   Constitutional: She is oriented to person, place, and time. She appears well-developed and well-nourished.  Non-toxic appearance. No distress. Nasal cannula in place.   Elderly, pleasant, in no acute distress    HENT:   Head: Normocephalic and atraumatic.   Right Ear: External ear normal.   Left Ear: External ear normal.   Nose: Nose normal.   Mouth/Throat: Oropharynx is clear and moist and mucous membranes are normal. No oropharyngeal exudate.   Eyes: Conjunctivae and EOM are normal. Pupils are equal, round, and reactive to light. No scleral icterus.   Neck: Trachea normal and normal range of motion. Neck supple. No JVD present. No muscular tenderness present. Carotid bruit is not present. No tracheal deviation present. No thyromegaly present.   Cardiovascular: Normal rate, regular rhythm, normal heart sounds and intact distal pulses. Exam reveals no gallop and no friction rub.   No murmur heard.  Pulmonary/Chest: Effort normal. No respiratory distress. She has decreased breath sounds (slightly ) in the right lower field and the left lower field. She has no wheezes. She has no rhonchi. She has no rales. She exhibits no tenderness.   Abdominal: Soft. Bowel sounds are normal. She exhibits no distension and no mass. There is no hepatosplenomegaly. There is no tenderness. There is no rebound and no guarding. No hernia.   Genitourinary:   Genitourinary Comments: Schafer in place    Musculoskeletal: She exhibits no deformity.        Right lower leg: She exhibits tenderness and edema (1-2+ Bilateral lower extremities from above ankle to right below the knee -- Improved).        Left lower leg: She exhibits tenderness and edema (1-2+ Bilateral lower extremities from above ankle to right below the knee -- Improved).      Vascular Status -  Her right foot exhibits abnormal foot edema. Her right foot exhibits normal foot vasculature . Her left foot exhibits abnormal foot edema. Her left foot exhibits normal foot vasculature .  Neurological: She is alert and oriented to person, place, and time. She has normal strength. No cranial nerve deficit or sensory deficit.   Skin: Skin is warm and dry. Capillary refill takes less than 2 seconds. No rash noted. There is erythema (Bilateral lower extremities from above ankle to right below the knee). No pallor.   Psychiatric: She has a normal mood and affect. Her speech is normal and behavior is normal. Judgment and thought content normal.   Nursing note and vitals reviewed.     ----------------------------------------------------------------------------------------------------------------------  Tele:    Sinus 70s     I have personally reviewed the EKG/Telemetry strips   ----------------------------------------------------------------------------------------------------------------------  Results from last 7 days   Lab Units 03/12/19  0427 03/11/19  2219 03/11/19  1733   CK TOTAL U/L 28 31 29   CKMB ng/mL 1.03 1.06 1.16   TROPONIN T ng/mL <0.010 <0.010 <0.010   MYOGLOBIN ng/mL 36.8 42.1 48.7     Results from last 7 days   Lab Units 03/11/19  1115   PROBNP pg/mL 1,955.0*         Results from last 7 days   Lab Units 03/13/19  0753 03/12/19  0427 03/11/19  1824 03/11/19  1733 03/11/19  1115   CRP mg/dL  --   --  0.40  --   --    LACTATE mmol/L  --   --   --   --  1.7   WBC 10*3/mm3 10.02 12.84*  --   --  10.44   HEMOGLOBIN g/dL 13.4 14.1  --   --  14.1   HEMATOCRIT % 43.4 44.9  --   --  44.8   MCV fL 99.3* 98.9*  --   --  98.7*   MCHC g/dL 30.9* 31.4*  --   --  31.5   PLATELETS 10*3/mm3 190 204  --   --  190   INR   --   --   --  1.21* 1.18*     Results from last 7 days   Lab Units 03/13/19  0753 03/12/19  0427 03/11/19  1733 03/11/19  1115   SODIUM mmol/L 140 139  --  141   POTASSIUM mmol/L 4.3  4.8  --  3.6   MAGNESIUM mg/dL 2.0 2.0 1.8  --    CHLORIDE mmol/L 102 105  --  105   CO2 mmol/L 31.4* 26.3  --  25.1   BUN mg/dL 9 10  --  12   CREATININE mg/dL 0.61 0.63  --  0.62   EGFR IF NONAFRICN AM mL/min/1.73 93 89  --  91   CALCIUM mg/dL 8.6 8.3*  --  9.0   GLUCOSE mg/dL 110* 105*  --  101*   ALBUMIN g/dL 2.67* 2.66*  --  2.73*   BILIRUBIN mg/dL 0.5 0.7  --  0.8   ALK PHOS U/L 89 94  --  101   AST (SGOT) U/L 22 28  --  28   ALT (SGPT) U/L 6 11  --  12   Estimated Creatinine Clearance: 51.4 mL/min (by C-G formula based on SCr of 0.61 mg/dL).    Lab Results   Component Value Date    TSH 0.530 03/11/2019     Blood Culture   Date Value Ref Range Status   03/11/2019 No growth at less than 24 hours  Preliminary   03/11/2019 No growth at less than 24 hours  Preliminary     I have personally reviewed the above laboratory results.   ----------------------------------------------------------------------------------------------------------------------  Imaging Results (last 24 hours)     Procedure Component Value Units Date/Time    XR Chest 1 View [[id number]] Collected:  03/11/19 1051     Updated:  03/11/19 1150    Narrative:       FINDINGS:   The lungs remain aerated.  Heart and mediastinum contours are unremarkable.  SMALL LEFT PLEURAL EFFUSION.  No pneumothorax.   Bony and soft tissue structures are unremarkable.    Impression:       SMALL LEFT PLEURAL EFFUSION.     This report was finalized on 3/11/2019 10:51 AM by Dr. Gregg Stapleton MD.      I have personally reviewed the above radiology results.     Cardioversion Operative Report     ----------------------------------------------------------------------------------------------------------------------  Assessment/Plan     · New onset atrial fibrillation with RVR: Patient underwent TODD and synchronized cardioversion this AM, tolerated procedure well. Echo with no thrombus. NSR and rate controlled now. Amiodarone gtt per cardiology. Cardiology input/assistance is much  appreciated. Continue Metoprolol and Cardizem. Telemetry monitoring in place. Patient on Eliquis for stroke prevention.   · Bilateral lower extremity edema and cellulitis with likely venous stasis dermatitis: Blood cultures NGTD. WBC elevation resolved. Afebrile, hemodynamically stable. Pt was given IV abx in ED, cont current empiric treatment with IV doxycycline. If were to worsen, consider IV vancomycin. Doppler of bilateral extremities negative for DVT.   · Elevated pro-BNP: Could be due to afib RVR vs new CHF. Pt does have edema in lower extremities. She is getting 20 mg IV lasix BID, diuresing well. Pro BNP in AM. Cont, closely monitor Strict Is and Os and daily weights.   · COPD without acute exacerbation: Supplemental O2 as needed to titrate SpO2 > 90%. PRN nebs available.   · Hypothyroidism: Documented history of hypothyroidism, patient is not on any thyroid hormone replacement therapy.  TSH WNL.  Cont off of medications.   · Essential hypertension: BP controlled. Pt on atenolol at home, will hold this for now. Pt on Cardizem and Metoprolol PO. Closely monitor blood pressure per hospital protocol and titrate medications as necessary.  · Macrocytosis, without anemia: Vitamin B12 and folate WNL. Vitamin D level greater than 120, discontinue home supplementation of 1000 units/day.   · Hypoalbuminemia: Cont to monitor.   · Former smoker   · F/E/N: no IV fluids. Replace electrolytes per protocol as necessary. Cardiac diet.  --------------------------------------------------  DVT Prophylaxis: Eliquis   GI Prophylaxis: Pepcid   Activity: Bed rest     The patient is high risk due to the following diagnoses/reasons:  New onset Afib RVR, elevated BNP, lower extremity cellulitis bilateral, advanced age, COPD, former smoker     I have discussed the patient's assessment and plan with the patient, patient's  and granddaughter present at bedside, and April AM RN.     Disposition: Remains hospitalized, post op  synchronized cardioversion and TODD, on amiodarone gtt currently       lAejandra Mcpherson PA-C  Hospitalist Service -- [facility]   Pager: [phone]    03/13/19  11:47 AM    Attending Physician: Lisa Beyer DO    ----------------------------------------------------------------------------------------------------------------------

## 2019-03-13 NOTE — PROGRESS NOTES
CARDIOLOY  INPATIENT PROGRESS NOTE         [address]    3/13/2019      PATIENT IDENTIFICATION:   Name:  Kayleen Brasher      MRN:  [id number]     87 y.o.  female             Reason for visit: Persistent atrial fibrillation    24 hour: No new events    SUBJECTIVE:    The patient has been stable overnight, however, has had some episodes of rapid ventricular rates and remains in atrial fibrillation.  Transesophageal echo this morning showed no evidence of thrombus and the patient underwent successful cardioversion back to sinus rhythm.  She is stable postoperatively.  OBJECTIVE:  Vitals:    03/13/19 0617 03/13/19 0822 03/13/19 0945 03/13/19 0950   BP: 127/70 (!) 121/101 110/58 117/59   BP Location: Right arm Right arm     Patient Position: Lying Sitting     Pulse: (!) 157 (!) 133 81 81   Resp: 20 20 18 18   Temp: 97.7 °F (36.5 °C)      TempSrc: Oral      SpO2: 100% 100% 98% 99%   Weight:       Height:               Body mass index is 35.9 kg/m².    Intake/Output Summary (Last 24 hours) at 3/13/2019 0955  Last data filed at 3/13/2019 0250  Gross per 24 hour   Intake 580 ml   Output 2050 ml   Net -1470 ml       Telemetry: Currently sinus rhythm in the 80s      Exam:  General appearance no acute distress    well nourished   HEENT sclera non-icteric    lips not cyanotic   Respiratory rate and depth normal    normal breath sounds    no rales, no wheeze   Cardiovascular JVP normal    regular rhythm    S1 normal, S2 normal    no S3, no S4    no murmur    lower extremity edema:none   Abdominal bowel sounds normal    abdomen soft, non-tender    no hepatosplenomegaly   Neuro-psych  sedated post procedure         Allergies   Allergen Reactions   • Sulfa Antibiotics      Scheduled meds:    amiodarone 150 mg Intravenous Once   Followed by      [START ON 3/14/2019] amiodarone 200 mg Oral Once   Followed by      [START ON 3/14/2019] amiodarone 200 mg Oral Q8H   Followed by      [START ON 3/21/2019] amiodarone 200 mg  Oral Q12H   Followed by      [START ON 4/4/2019] amiodarone 200 mg Oral Daily   apixaban 5 mg Oral Q12H   aspirin 81 mg Oral Daily   diltiaZEM 30 mg Oral Q12H   doxycycline 100 mg Intravenous Q12H   furosemide 20 mg Oral BID   lactobacillus acidophilus 1 capsule Oral Daily   metoprolol tartrate 25 mg Oral Q12H   sodium chloride 3 mL Intravenous Q12H   vitamin B-12 1,000 mcg Oral Daily     IV meds:                        amiodarone 1 mg/min   Followed by    amiodarone 0.5 mg/min   Pharmacy Consult      Data Review:  Results from last 7 days   Lab Units 03/13/19  0753 03/12/19  0427 03/11/19  1115   SODIUM mmol/L 140 139 141   BUN mg/dL 9 10 12   CREATININE mg/dL 0.61 0.63 0.62   GLUCOSE mg/dL 110* 105* 101*             Estimated Creatinine Clearance: 51.4 mL/min (by C-G formula based on SCr of 0.61 mg/dL).  Results from last 7 days   Lab Units 03/13/19  0753 03/12/19  0427 03/11/19  1115   WBC 10*3/mm3 10.02 12.84* 10.44   HEMOGLOBIN g/dL 13.4 14.1 14.1     Results from last 7 days   Lab Units 03/11/19  1733 03/11/19  1115   INR  1.21* 1.18*     Results from last 7 days   Lab Units 03/13/19  0753 03/12/19  0427 03/11/19  1115   ALT (SGPT) U/L 6 11 12   AST (SGOT) U/L 22 28 28     No results found for: DIGOXIN   Lab Results   Component Value Date    TSH 0.530 03/11/2019           Invalid input(s): LDLCALC            Imaging (last 24 hr):   Imaging Results (last 24 hours)     ** No results found for the last 24 hours. **            ASSESSMENT:     Atrial fibrillation with RVR (CMS/HCC)    COPD with acute exacerbation (CMS/HCC)    Other specified hypothyroidism    Essential hypertension    Lower extremity cellulitis            PLAN:  Telemetry monitoring after TODD cardioversion  Load IV amiodarone per protocol for maintenance of sinus rhythm  Continue anticoagulation  Anticipate home tomorrow if she remains stable, discussed with family            Judson Zhang MD  3/13/2019    9:55 AM

## 2019-03-13 NOTE — NURSING NOTE
ERAN ADMISSION NOTE:  Patient enrolled in the ERAN program to assist with education and support during transition home from hospital. ERAN home  will see patient at home within 48 hours of discharge and will follow up with patient in home and telephonically for 6 weeks post discharge under the Charu Model.    Clinical Assessment Instrument Scores:  Short Portable Mental Status Questionnaire- 8  Geriatric Depression Scale-2  Instrumental Activities of Daily Living-7  MUNOZ Activities of Daily Living-6  Overall Quality of Life- 2  Subjective Health Rating- 2  Symptom Bother Scale-17  Generalized Anxiety Scale-2  Health Literacy Test- 0

## 2019-03-13 NOTE — ANESTHESIA POSTPROCEDURE EVALUATION
Patient: Kayleen Brasher    Procedure Summary     Date:  03/13/19 Room / Location:  [facility] NONINVASIVE LAB    Anesthesia Start:  0922 Anesthesia Stop:  0942    Procedure:  ADULT TRANSESOPHAGEAL ECHO (TODD) W/ CONT IF NECESSARY PER PROTOCOL Diagnosis:  (Guidance for Cardiac Intervention)    Scheduled Providers:  Tobi Hutchinson MD Provider:  Gerson Tee CRNA    Anesthesia Type:  general ASA Status:  3          Anesthesia Type: general  Last vitals  BP   117/59 (03/13/19 0950)   Temp   97.7 °F (36.5 °C) (03/13/19 0617)   Pulse   81 (03/13/19 0950)   Resp   18 (03/13/19 0950)     SpO2   99 % (03/13/19 0950)     Post Anesthesia Care and Evaluation    Patient location during evaluation: PHASE II  Patient participation: complete - patient participated  Level of consciousness: awake and alert  Pain score: 1  Pain management: adequate  Airway patency: patent  Anesthetic complications: No anesthetic complications  PONV Status: controlled  Cardiovascular status: acceptable  Respiratory status: acceptable  Hydration status: acceptable

## 2019-03-14 PROBLEM — I48.0 PAROXYSMAL ATRIAL FIBRILLATION (HCC): Status: ACTIVE | Noted: 2019-03-14

## 2019-03-14 LAB
ANION GAP SERPL CALCULATED.3IONS-SCNC: 9.3 MMOL/L
BASOPHILS # BLD AUTO: 0.02 10*3/MM3 (ref 0–0.2)
BASOPHILS NFR BLD AUTO: 0.2 % (ref 0–1.5)
BUN BLD-MCNC: 9 MG/DL (ref 8–23)
BUN/CREAT SERPL: 13.8 (ref 7–25)
CALCIUM SPEC-SCNC: 8.2 MG/DL (ref 8.6–10.5)
CHLORIDE SERPL-SCNC: 102 MMOL/L (ref 98–107)
CO2 SERPL-SCNC: 28.7 MMOL/L (ref 22–29)
CREAT BLD-MCNC: 0.65 MG/DL (ref 0.57–1)
DEPRECATED RDW RBC AUTO: 53.4 FL (ref 37–54)
EOSINOPHIL # BLD AUTO: 0.52 10*3/MM3 (ref 0–0.4)
EOSINOPHIL NFR BLD AUTO: 5 % (ref 0.3–6.2)
ERYTHROCYTE [DISTWIDTH] IN BLOOD BY AUTOMATED COUNT: 14.9 % (ref 12.3–15.4)
GFR SERPL CREATININE-BSD FRML MDRD: 86 ML/MIN/1.73
GLUCOSE BLD-MCNC: 103 MG/DL (ref 65–99)
HCT VFR BLD AUTO: 40.7 % (ref 34–46.6)
HGB BLD-MCNC: 12.6 G/DL (ref 12–15.9)
IMM GRANULOCYTES # BLD AUTO: 0.02 10*3/MM3 (ref 0–0.05)
IMM GRANULOCYTES NFR BLD AUTO: 0.2 % (ref 0–0.5)
LYMPHOCYTES # BLD AUTO: 1.74 10*3/MM3 (ref 0.7–3.1)
LYMPHOCYTES NFR BLD AUTO: 16.9 % (ref 19.6–45.3)
MAGNESIUM SERPL-MCNC: 1.8 MG/DL (ref 1.6–2.4)
MCH RBC QN AUTO: 30.9 PG (ref 26.6–33)
MCHC RBC AUTO-ENTMCNC: 31 G/DL (ref 31.5–35.7)
MCV RBC AUTO: 99.8 FL (ref 79–97)
MONOCYTES # BLD AUTO: 1.45 10*3/MM3 (ref 0.1–0.9)
MONOCYTES NFR BLD AUTO: 14.1 % (ref 5–12)
NEUTROPHILS # BLD AUTO: 6.55 10*3/MM3 (ref 1.4–7)
NEUTROPHILS NFR BLD AUTO: 63.6 % (ref 42.7–76)
NT-PROBNP SERPL-MCNC: 2503 PG/ML (ref 0–1800)
PHOSPHATE SERPL-MCNC: 3.3 MG/DL (ref 2.5–4.5)
PLATELET # BLD AUTO: 167 10*3/MM3 (ref 140–450)
PMV BLD AUTO: 11 FL (ref 6–12)
POTASSIUM BLD-SCNC: 3.7 MMOL/L (ref 3.5–5.2)
RBC # BLD AUTO: 4.08 10*6/MM3 (ref 3.77–5.28)
SODIUM BLD-SCNC: 140 MMOL/L (ref 136–145)
WBC NRBC COR # BLD: 10.3 10*3/MM3 (ref 3.4–10.8)

## 2019-03-14 PROCEDURE — 85025 COMPLETE CBC W/AUTO DIFF WBC: CPT | Performed by: PHYSICIAN ASSISTANT

## 2019-03-14 PROCEDURE — 25010000002 MAGNESIUM SULFATE IN D5W 1G/100ML (PREMIX) 1-5 GM/100ML-% SOLUTION: Performed by: PHYSICIAN ASSISTANT

## 2019-03-14 PROCEDURE — 83735 ASSAY OF MAGNESIUM: CPT | Performed by: PHYSICIAN ASSISTANT

## 2019-03-14 PROCEDURE — 25010000002 FUROSEMIDE PER 20 MG: Performed by: PHYSICIAN ASSISTANT

## 2019-03-14 PROCEDURE — 99232 SBSQ HOSP IP/OBS MODERATE 35: CPT | Performed by: INTERNAL MEDICINE

## 2019-03-14 PROCEDURE — 94799 UNLISTED PULMONARY SVC/PX: CPT

## 2019-03-14 PROCEDURE — 80048 BASIC METABOLIC PNL TOTAL CA: CPT | Performed by: PHYSICIAN ASSISTANT

## 2019-03-14 PROCEDURE — 84100 ASSAY OF PHOSPHORUS: CPT | Performed by: PHYSICIAN ASSISTANT

## 2019-03-14 PROCEDURE — 99233 SBSQ HOSP IP/OBS HIGH 50: CPT | Performed by: PHYSICIAN ASSISTANT

## 2019-03-14 PROCEDURE — 83880 ASSAY OF NATRIURETIC PEPTIDE: CPT | Performed by: PHYSICIAN ASSISTANT

## 2019-03-14 RX ORDER — FUROSEMIDE 10 MG/ML
20 INJECTION INTRAMUSCULAR; INTRAVENOUS EVERY 12 HOURS
Status: DISCONTINUED | OUTPATIENT
Start: 2019-03-14 | End: 2019-03-15 | Stop reason: HOSPADM

## 2019-03-14 RX ORDER — MAGNESIUM SULFATE 1 G/100ML
1 INJECTION INTRAVENOUS ONCE
Status: COMPLETED | OUTPATIENT
Start: 2019-03-14 | End: 2019-03-14

## 2019-03-14 RX ADMIN — METOPROLOL TARTRATE 25 MG: 25 TABLET, FILM COATED ORAL at 19:49

## 2019-03-14 RX ADMIN — MAGNESIUM SULFATE IN DEXTROSE 1 G: 10 INJECTION, SOLUTION INTRAVENOUS at 06:34

## 2019-03-14 RX ADMIN — APIXABAN 5 MG: 5 TABLET, FILM COATED ORAL at 19:50

## 2019-03-14 RX ADMIN — CYANOCOBALAMIN TAB 1000 MCG 1000 MCG: 1000 TAB at 09:02

## 2019-03-14 RX ADMIN — DOXYCYCLINE 100 MG: 100 INJECTION, POWDER, LYOPHILIZED, FOR SOLUTION INTRAVENOUS at 09:05

## 2019-03-14 RX ADMIN — ASPIRIN 81 MG: 81 TABLET ORAL at 09:02

## 2019-03-14 RX ADMIN — APIXABAN 5 MG: 5 TABLET, FILM COATED ORAL at 09:02

## 2019-03-14 RX ADMIN — Medication 1 CAPSULE: at 09:01

## 2019-03-14 RX ADMIN — SODIUM CHLORIDE, PRESERVATIVE FREE 3 ML: 5 INJECTION INTRAVENOUS at 09:06

## 2019-03-14 RX ADMIN — METOPROLOL TARTRATE 25 MG: 25 TABLET, FILM COATED ORAL at 09:02

## 2019-03-14 RX ADMIN — DILTIAZEM HYDROCHLORIDE 30 MG: 30 TABLET, FILM COATED ORAL at 09:02

## 2019-03-14 RX ADMIN — AMIODARONE HYDROCHLORIDE 200 MG: 200 TABLET ORAL at 19:49

## 2019-03-14 RX ADMIN — DOXYCYCLINE 100 MG: 100 INJECTION, POWDER, LYOPHILIZED, FOR SOLUTION INTRAVENOUS at 19:51

## 2019-03-14 RX ADMIN — FUROSEMIDE 20 MG: 20 TABLET ORAL at 09:02

## 2019-03-14 RX ADMIN — DILTIAZEM HYDROCHLORIDE 30 MG: 30 TABLET, FILM COATED ORAL at 19:49

## 2019-03-14 RX ADMIN — AMIODARONE HYDROCHLORIDE 200 MG: 200 TABLET ORAL at 12:33

## 2019-03-14 RX ADMIN — FUROSEMIDE 20 MG: 10 INJECTION, SOLUTION INTRAMUSCULAR; INTRAVENOUS at 17:50

## 2019-03-14 NOTE — PROGRESS NOTES
[facility] Medicine Services  PROGRESS NOTE     Patient Identification:  Name:  Kayleen Brasher  Age:  87 y.o.  Sex:  female  :  1931  MRN:  [id number]  Visit Number:  [id number]  Primary Care Provider:  Tono Yang MD    Length of stay:  3    ----------------------------------------------------------------------------------------------------------------------  Subjective     Chief Complaint:  Follow up for new onset atrial fibrillation and bilateral lower extremity cellulitis     History of Presenting Illness/Hospital Course:  Patient is an 86 yo female with past medical history significant for essential hypertension, non-oxygen dependent COPD, hypothyroidism, and advanced age that was admitted on 3/11/2019 for new onset atrial fibrillation with RVR and bilateral lower extremity cellulitis/edema with likely venous stasis dermatitis.  Patient reported a 2-week history of bilateral lower extremity edema and erythema with progressively worsening dyspnea on exertion. Cellulitis treated with IV doxycycline. Patient was initially started on a Cardizem drip after a bolus given in the emergency department as well as a dose of digoxin in the emergency department.  Patient was started on a heparin drip for anticoagulation. Patient has been transitioned to PO Eliquis. Patient underwent TODD cardioversion 3/13/2019 per cardiology, converted to sinus rhythm. Patient was then loaded with Amiodarone gtt, now on PO amiodarone. Patient on PO Cardizem and Metoprolol. Intermittent diuresis with IV lasix, diuresing well.     Subjective:  Today, the patient was resting in bed per my evaluation this morning with  present at bedside. Patient remains in sinus rhythm after cardioversion. Patient's IV with amiodarone infusing extravasated overnight, resulting with left upper extremity cellulitis and edema. Patient reports pain in right forearm, IV has been removed per  protocol. She states that her lower extremity cellulitis is much improved, more pink now instead of red. Edema improved. She states that other than arm pain, she is of no complaints. She denies any chest pain or shortness of breath. No palpitations. Has only ambulated some in her room to the restroom. Denies any abdominal pain, nausea, vomiting, or diarrhea.     Present during exam: Patient's    ----------------------------------------------------------------------------------------------------------------------  Objective     Consults:  · Cardiology     Procedures:  · 3/11/2019: Schafer catheter insertion   · 3/13/2019: TODD with synchronized cardioversion -- Dr. Zhang, cardiology   · 3/14/2019: Schafer catheter removal     Drips:  · None currently     Current Hospital Meds:    amiodarone 200 mg Oral Q8H   Followed by      [START ON 3/21/2019] amiodarone 200 mg Oral Q12H   Followed by      [START ON 4/4/2019] amiodarone 200 mg Oral Daily   apixaban 5 mg Oral Q12H   aspirin 81 mg Oral Daily   diltiaZEM 30 mg Oral Q12H   doxycycline 100 mg Intravenous Q12H   furosemide 20 mg Intravenous Q12H   lactobacillus acidophilus 1 capsule Oral Daily   metoprolol tartrate 25 mg Oral Q12H   sodium chloride 3 mL Intravenous Q12H   vitamin B-12 1,000 mcg Oral Daily       Pharmacy Consult      ----------------------------------------------------------------------------------------------------------------------  Vital Signs:  Temp:  [97.4 °F (36.3 °C)-97.6 °F (36.4 °C)] 97.6 °F (36.4 °C)  Heart Rate:  [79-93] 79  Resp:  [18] 18  BP: (132-153)/(59-69) 132/69  Mean Arterial Pressure (Non-Invasive) for the past 24 hrs (Last 3 readings):   Noninvasive MAP (mmHg)   03/14/19 1037 83   03/14/19 0713 104   03/14/19 0422 80     SpO2 Percentage    03/14/19 0422 03/14/19 0713 03/14/19 1037   SpO2: 99% 93% 90%     SpO2:  [90 %-99 %] 90 %  on  Flow (L/min):  [3] 3;   Device (Oxygen Therapy): nasal cannula    Body mass index is 36.11  kg/m².  Wt Readings from Last 3 Encounters:   03/14/19 89.6 kg (197 lb 7 oz)   11/26/16 90.7 kg (200 lb)        Intake/Output Summary (Last 24 hours) at 3/14/2019 1658  Last data filed at 3/14/2019 0920  Gross per 24 hour   Intake 580 ml   Output 1350 ml   Net -770 ml     Diet Regular; Cardiac  ----------------------------------------------------------------------------------------------------------------------  Physical exam:  Physical Exam   Constitutional: She is oriented to person, place, and time. She appears well-developed and well-nourished.  Non-toxic appearance. No distress.   Elderly, pleasant, in no acute distress    HENT:   Head: Normocephalic and atraumatic.   Right Ear: External ear normal.   Left Ear: External ear normal.   Nose: Nose normal.   Mouth/Throat: Oropharynx is clear and moist and mucous membranes are normal. No oropharyngeal exudate.   Eyes: Conjunctivae and EOM are normal. Pupils are equal, round, and reactive to light. No scleral icterus.   Neck: Trachea normal and normal range of motion. Neck supple. No JVD present. No muscular tenderness present. Carotid bruit is not present. No tracheal deviation present. No thyromegaly present.   Cardiovascular: Normal rate, regular rhythm, normal heart sounds and intact distal pulses. Exam reveals no gallop and no friction rub.   No murmur heard.  Pulmonary/Chest: Effort normal. No respiratory distress. She has decreased breath sounds (slightly ) in the right lower field and the left lower field. She has no wheezes. She has no rhonchi. She has no rales. She exhibits no tenderness.   Abdominal: Soft. Bowel sounds are normal. She exhibits no distension and no mass. There is no hepatosplenomegaly. There is no tenderness. There is no rebound and no guarding. No hernia.   Genitourinary:   Genitourinary Comments: Schafer in place    Musculoskeletal: She exhibits no deformity.        Left upper arm: She exhibits tenderness, swelling and edema.        Right  lower leg: She exhibits tenderness and edema (1+ Bilateral lower extremities from above ankle to right below the knee -- Improved).        Left lower leg: She exhibits tenderness and edema (1+ Bilateral lower extremities from above ankle to right below the knee -- Improved).   LUE edema and erythema with area of cellulitis from IV extravasation, area circled with black marker for monitoring      Vascular Status -  Her right foot exhibits abnormal foot edema. Her right foot exhibits normal foot vasculature . Her left foot exhibits abnormal foot edema. Her left foot exhibits normal foot vasculature .  Neurological: She is alert and oriented to person, place, and time. She has normal strength. No cranial nerve deficit or sensory deficit.   Skin: Skin is warm and dry. Capillary refill takes less than 2 seconds. No rash noted. There is erythema (Bilateral lower extremities from above ankle to right below the knee -- significnatly improved, less erythematous today on exam ). No pallor.   Psychiatric: She has a normal mood and affect. Her speech is normal and behavior is normal. Judgment and thought content normal.   Nursing note and vitals reviewed.     ----------------------------------------------------------------------------------------------------------------------  Tele:    Sinus 70s - 80s    I have personally reviewed the EKG/Telemetry strips   ----------------------------------------------------------------------------------------------------------------------  Results from last 7 days   Lab Units 03/12/19  0427 03/11/19  2219 03/11/19  1733   CK TOTAL U/L 28 31 29   CKMB ng/mL 1.03 1.06 1.16   TROPONIN T ng/mL <0.010 <0.010 <0.010   MYOGLOBIN ng/mL 36.8 42.1 48.7     Results from last 7 days   Lab Units 03/14/19  0337 03/13/19  1236 03/11/19  1115   PROBNP pg/mL 2,503.0* 4,334.0* 1,955.0*         Results from last 7 days   Lab Units 03/14/19  0337 03/13/19  0753 03/12/19  0427 03/11/19  1824 03/11/19  1733  03/11/19  1115   CRP mg/dL  --   --   --  0.40  --   --    LACTATE mmol/L  --   --   --   --   --  1.7   WBC 10*3/mm3 10.30 10.02 12.84*  --   --  10.44   HEMOGLOBIN g/dL 12.6 13.4 14.1  --   --  14.1   HEMATOCRIT % 40.7 43.4 44.9  --   --  44.8   MCV fL 99.8* 99.3* 98.9*  --   --  98.7*   MCHC g/dL 31.0* 30.9* 31.4*  --   --  31.5   PLATELETS 10*3/mm3 167 190 204  --   --  190   INR   --   --   --   --  1.21* 1.18*     Results from last 7 days   Lab Units 03/14/19  0337 03/13/19  0753 03/12/19  0427  03/11/19  1115   SODIUM mmol/L 140 140 139  --  141   POTASSIUM mmol/L 3.7 4.3 4.8  --  3.6   MAGNESIUM mg/dL 1.8 2.0 2.0   < >  --    CHLORIDE mmol/L 102 102 105  --  105   CO2 mmol/L 28.7 31.4* 26.3  --  25.1   BUN mg/dL 9 9 10  --  12   CREATININE mg/dL 0.65 0.61 0.63  --  0.62   EGFR IF NONAFRICN AM mL/min/1.73 86 93 89  --  91   CALCIUM mg/dL 8.2* 8.6 8.3*  --  9.0   GLUCOSE mg/dL 103* 110* 105*  --  101*   ALBUMIN g/dL  --  2.67* 2.66*  --  2.73*   BILIRUBIN mg/dL  --  0.5 0.7  --  0.8   ALK PHOS U/L  --  89 94  --  101   AST (SGOT) U/L  --  22 28  --  28   ALT (SGPT) U/L  --  6 11  --  12    < > = values in this interval not displayed.   Estimated Creatinine Clearance: 51.5 mL/min (by C-G formula based on SCr of 0.65 mg/dL).    Lab Results   Component Value Date    TSH 0.530 03/11/2019     Blood Culture   Date Value Ref Range Status   03/11/2019 No growth at less than 24 hours  Preliminary   03/11/2019 No growth at less than 24 hours  Preliminary     I have personally reviewed the above laboratory results.   ----------------------------------------------------------------------------------------------------------------------  Imaging Results (last 24 hours)     Procedure Component Value Units Date/Time    XR Chest 1 View [[id number]] Collected:  03/11/19 1051     Updated:  03/11/19 1150    Narrative:       FINDINGS:   The lungs remain aerated.  Heart and mediastinum contours are unremarkable.  SMALL LEFT PLEURAL  EFFUSION.  No pneumothorax.   Bony and soft tissue structures are unremarkable.    Impression:       SMALL LEFT PLEURAL EFFUSION.     This report was finalized on 3/11/2019 10:51 AM by Dr. Gregg Stapleton MD.      I have personally reviewed the above radiology results.     Transthoracic Echocardiogram 3/12/2019      Cardioversion Operative Report     ----------------------------------------------------------------------------------------------------------------------  Assessment/Plan     · New onset atrial fibrillation with RVR: Patient underwent TODD and synchronized cardioversion yesterday, tolerated procedure well. Echo with no thrombus. NSR and rate controlled now. Amiodarone gtt titrated off, now on PO. Cardiology input/assistance is much appreciated. Continue Metoprolol and Cardizem. Telemetry monitoring in place. Patient on Eliquis for stroke prevention. Cardiology has signed off and will follow up with patient in outpatient setting in two weeks post discharge. Will have nursing staff ambulate patient and see how she does, dixon removed earlier this morning.   · IV extravasation of amiodarone in LUE with cellulitis and edema: Kpad for heat per protocol. IV has been removed, area affected circled with marker for monitoring. Will have patient elevate extremity.   · Bilateral lower extremity edema and cellulitis with likely venous stasis dermatitis: Blood cultures NGTD. WBC elevation resolved. Afebrile, hemodynamically stable. Pt was given IV abx in ED, cont current empiric treatment with IV doxycycline.  Doppler of bilateral extremities negative for DVT. Cont to monitor closely    · Elevated pro-BNP: Could be due to afib RVR vs new CHF. Echo with EF 70%, diastolic dysfunction present. Pt does have edema in lower extremities. Continue with IV lasix 20 mg, diuresing well. Pro BNP stable, slightly improved. Cont, closely monitor Strict Is and Os and daily weights.   · COPD without acute exacerbation: Supplemental O2  as needed to titrate SpO2 > 90%. PRN nebs available.   · Hypothyroidism: Documented history of hypothyroidism, patient is not on any thyroid hormone replacement therapy.  TSH WNL.  Cont off of medications.   · Essential hypertension: BP controlled. Pt on atenolol at home, will hold this for now. Pt on Cardizem and Metoprolol PO. Closely monitor blood pressure per hospital protocol and titrate medications as necessary.  · Macrocytosis, without anemia: Vitamin B12 and folate WNL. Vitamin D level greater than 120, discontinue home supplementation of 1000 units/day.   · Hypoalbuminemia: Cont to monitor.   · Former smoker   · F/E/N: no IV fluids. Replace electrolytes per protocol as necessary. Cardiac diet.  --------------------------------------------------  DVT Prophylaxis: Eliquis   GI Prophylaxis: Pepcid   Activity: Bed rest     The patient is high risk due to the following diagnoses/reasons:  New onset Afib RVR, elevated BNP, lower extremity cellulitis bilateral, advanced age, COPD, former smoker, extravasation of IV amiodarone      I have discussed the patient's assessment and plan with the patient, patient's  and granddaughter present at bedside, and April AM RN.     Disposition: Remains hospitalized for IV antibiotics and for heart rate monitoring.       Alejandra Mcpherson PA-C  Hospitalist Service -- [facility]   Pager: [phone]    03/14/19  4:58 PM    Attending Physician: Lisa Beyer DO    ----------------------------------------------------------------------------------------------------------------------

## 2019-03-14 NOTE — PLAN OF CARE
Problem: Fall Risk (Adult)  Goal: Absence of Fall  Outcome: Ongoing (interventions implemented as appropriate)      Problem: Patient Care Overview  Goal: Plan of Care Review  Outcome: Ongoing (interventions implemented as appropriate)      Problem: Cardiac Output Decreased (Adult)  Goal: Effective Tissue Perfusion  Outcome: Ongoing (interventions implemented as appropriate)

## 2019-03-14 NOTE — PLAN OF CARE
Problem: Fall Risk (Adult)  Goal: Absence of Fall  Outcome: Ongoing (interventions implemented as appropriate)      Problem: Patient Care Overview  Goal: Plan of Care Review  Outcome: Ongoing (interventions implemented as appropriate)    Goal: Individualization and Mutuality  Outcome: Ongoing (interventions implemented as appropriate)    Goal: Discharge Needs Assessment  Outcome: Ongoing (interventions implemented as appropriate)    Goal: Interprofessional Rounds/Family Conf  Outcome: Ongoing (interventions implemented as appropriate)      Problem: Cardiac Output Decreased (Adult)  Goal: Identify Related Risk Factors and Signs and Symptoms  Outcome: Ongoing (interventions implemented as appropriate)      Problem: Patient Care Overview  Goal: Plan of Care Review  Outcome: Ongoing (interventions implemented as appropriate)    Goal: Individualization and Mutuality  Outcome: Ongoing (interventions implemented as appropriate)    Goal: Interprofessional Rounds/Family Conf  Outcome: Ongoing (interventions implemented as appropriate)      Problem: Skin Injury Risk (Adult)  Goal: Identify Related Risk Factors and Signs and Symptoms  Outcome: Ongoing (interventions implemented as appropriate)

## 2019-03-14 NOTE — PROGRESS NOTES
[facility]  INPATIENT PROGRESS NOTE         [address]    3/14/2019    PATIENT IDENTIFICATION:   Name:  Kayleen Brasher      MRN:  [id number]     87 y.o.  female             Reason for visit: New onset atrial fibrillation with rapid ventricular response    24 hour: Patient has maintained sinus rhythm since cardioversion.    SUBJECTIVE:    Patient states that she is doing well she has developed some erythema that appears to be possible development of cellulitis from IV catheter site, which has been removed. Patient is on IV doxycycline.  OBJECTIVE:  Vitals:    03/13/19 1825 03/13/19 1830 03/14/19 0422 03/14/19 0713   BP: 153/66 137/59 136/62 151/59   BP Location: Right arm Right arm Right arm Right arm   Patient Position: Sitting Lying Lying Lying   Pulse: 89 90 81 93   Resp: 18  18 18   Temp: 97.4 °F (36.3 °C)  97.6 °F (36.4 °C) 97.6 °F (36.4 °C)   TempSrc: Oral  Axillary Oral   SpO2: 95%  99% 93%   Weight:   89.6 kg (197 lb 7 oz)    Height:               Body mass index is 36.11 kg/m².    Intake/Output Summary (Last 24 hours) at 3/14/2019 1029  Last data filed at 3/14/2019 0920  Gross per 24 hour   Intake 580 ml   Output 1350 ml   Net -770 ml       Telemetry: sinus in the 70s      Exam:  General appearance no acute distress    well nourished   HEENT sclera non-icteric    lips not cyanotic   Respiratory rate and depth normal    normal breath sounds    no rales, no wheeze   Cardiovascular JVP normal    regular rhythm    S1 normal, S2 normal    no S3, no S4    no murmur    lower extremity edema: 2+ pedal edema with erythema up to the midshin level   Abdominal bowel sounds normal    abdomen soft, non-tender    no hepatosplenomegaly   Neuro-psych oriented to person, place, time     Musculoskeletal Cooperative  Erythema development on left forearm.     Allergies   Allergen Reactions   • Sulfa Antibiotics      Scheduled meds:      amiodarone 200 mg Oral Once   Followed by      amiodarone 200 mg Oral Q8H    Followed by      [START ON 3/21/2019] amiodarone 200 mg Oral Q12H   Followed by      [START ON 4/4/2019] amiodarone 200 mg Oral Daily   apixaban 5 mg Oral Q12H   aspirin 81 mg Oral Daily   diltiaZEM 30 mg Oral Q12H   doxycycline 100 mg Intravenous Q12H   furosemide 20 mg Oral BID   lactobacillus acidophilus 1 capsule Oral Daily   metoprolol tartrate 25 mg Oral Q12H   sodium chloride 3 mL Intravenous Q12H   vitamin B-12 1,000 mcg Oral Daily     IV meds:                          amiodarone 0.5 mg/min Last Rate: Stopped (03/14/19 0634)   Pharmacy Consult       Data Review:  Results from last 7 days   Lab Units 03/14/19  0337 03/13/19  0753 03/12/19  0427 03/11/19  1115   SODIUM mmol/L 140 140 139 141   BUN mg/dL 9 9 10 12   CREATININE mg/dL 0.65 0.61 0.63 0.62   GLUCOSE mg/dL 103* 110* 105* 101*             Estimated Creatinine Clearance: 51.5 mL/min (by C-G formula based on SCr of 0.65 mg/dL).  Results from last 7 days   Lab Units 03/14/19  0337 03/13/19  0753 03/12/19  0427 03/11/19  1115   WBC 10*3/mm3 10.30 10.02 12.84* 10.44   HEMOGLOBIN g/dL 12.6 13.4 14.1 14.1     Results from last 7 days   Lab Units 03/11/19  1733 03/11/19  1115   INR  1.21* 1.18*     Results from last 7 days   Lab Units 03/13/19  0753 03/12/19  0427 03/11/19  1115   ALT (SGPT) U/L 6 11 12   AST (SGOT) U/L 22 28 28     No results found for: DIGOXIN   Lab Results   Component Value Date    TSH 0.530 03/11/2019           Invalid input(s): LDLCALC            Imaging (last 24 hr):   Imaging Results (last 24 hours)     ** No results found for the last 24 hours. **            ASSESSMENT:     Paroxysmal atrial fibrillation (CMS/HCC)    COPD with acute exacerbation (CMS/HCC)    Other specified hypothyroidism    Essential hypertension    Lower extremity cellulitis      Initial cardiac assessment:  New onset Atrial fibrillation       PLAN:  1. Paroxysmal atrial fibrillation: now maintaining his rhythm after cardioversion with amiodarone therapy.   Anticoagulated with Eliquis.  From cardiac standpoint patient is asymptomatic and is now maintaining sinus rhythm will go ahead and sign off please have patient follow-up in our clinic in 2 weeks  2. Cellulitis: Looks improved with diuretics still erythematous patient has developed some erythema on the left forearm around IV catheter site which has now been removed.  Being managed by hospitalist.      Abraham Martinez PA-C  3/14/2019    10:29 AM

## 2019-03-15 VITALS
WEIGHT: 197.5 LBS | HEIGHT: 62 IN | OXYGEN SATURATION: 97 % | HEART RATE: 84 BPM | TEMPERATURE: 97.7 F | BODY MASS INDEX: 36.34 KG/M2 | RESPIRATION RATE: 18 BRPM | SYSTOLIC BLOOD PRESSURE: 134 MMHG | DIASTOLIC BLOOD PRESSURE: 62 MMHG

## 2019-03-15 LAB
ALBUMIN SERPL-MCNC: 2.43 G/DL (ref 3.5–5.2)
ALBUMIN/GLOB SERPL: 0.7 G/DL
ALP SERPL-CCNC: 87 U/L (ref 39–117)
ALT SERPL W P-5'-P-CCNC: 11 U/L (ref 1–33)
ANION GAP SERPL CALCULATED.3IONS-SCNC: 9.7 MMOL/L
AST SERPL-CCNC: 32 U/L (ref 1–32)
BASOPHILS # BLD AUTO: 0.02 10*3/MM3 (ref 0–0.2)
BASOPHILS NFR BLD AUTO: 0.1 % (ref 0–1.5)
BILIRUB SERPL-MCNC: 0.6 MG/DL (ref 0.1–1.2)
BUN BLD-MCNC: 10 MG/DL (ref 8–23)
BUN/CREAT SERPL: 16.9 (ref 7–25)
CALCIUM SPEC-SCNC: 8.3 MG/DL (ref 8.6–10.5)
CHLORIDE SERPL-SCNC: 99 MMOL/L (ref 98–107)
CO2 SERPL-SCNC: 28.3 MMOL/L (ref 22–29)
CREAT BLD-MCNC: 0.59 MG/DL (ref 0.57–1)
DEPRECATED RDW RBC AUTO: 52 FL (ref 37–54)
EOSINOPHIL # BLD AUTO: 0.31 10*3/MM3 (ref 0–0.4)
EOSINOPHIL NFR BLD AUTO: 2.3 % (ref 0.3–6.2)
ERYTHROCYTE [DISTWIDTH] IN BLOOD BY AUTOMATED COUNT: 14.9 % (ref 12.3–15.4)
GFR SERPL CREATININE-BSD FRML MDRD: 96 ML/MIN/1.73
GLOBULIN UR ELPH-MCNC: 3.7 GM/DL
GLUCOSE BLD-MCNC: 100 MG/DL (ref 65–99)
HCT VFR BLD AUTO: 40.1 % (ref 34–46.6)
HGB BLD-MCNC: 12.8 G/DL (ref 12–15.9)
IMM GRANULOCYTES # BLD AUTO: 0.03 10*3/MM3 (ref 0–0.05)
IMM GRANULOCYTES NFR BLD AUTO: 0.2 % (ref 0–0.5)
LYMPHOCYTES # BLD AUTO: 2.08 10*3/MM3 (ref 0.7–3.1)
LYMPHOCYTES NFR BLD AUTO: 15.6 % (ref 19.6–45.3)
MAGNESIUM SERPL-MCNC: 1.8 MG/DL (ref 1.6–2.4)
MCH RBC QN AUTO: 31.4 PG (ref 26.6–33)
MCHC RBC AUTO-ENTMCNC: 31.9 G/DL (ref 31.5–35.7)
MCV RBC AUTO: 98.3 FL (ref 79–97)
MONOCYTES # BLD AUTO: 1.84 10*3/MM3 (ref 0.1–0.9)
MONOCYTES NFR BLD AUTO: 13.8 % (ref 5–12)
NEUTROPHILS # BLD AUTO: 9.06 10*3/MM3 (ref 1.4–7)
NEUTROPHILS NFR BLD AUTO: 68 % (ref 42.7–76)
NT-PROBNP SERPL-MCNC: 2049 PG/ML (ref 0–1800)
PHOSPHATE SERPL-MCNC: 2.8 MG/DL (ref 2.5–4.5)
PLATELET # BLD AUTO: 186 10*3/MM3 (ref 140–450)
PMV BLD AUTO: 11.6 FL (ref 6–12)
POTASSIUM BLD-SCNC: 3.9 MMOL/L (ref 3.5–5.2)
PROT SERPL-MCNC: 6.1 G/DL (ref 6–8.5)
RBC # BLD AUTO: 4.08 10*6/MM3 (ref 3.77–5.28)
SODIUM BLD-SCNC: 137 MMOL/L (ref 136–145)
WBC NRBC COR # BLD: 13.34 10*3/MM3 (ref 3.4–10.8)

## 2019-03-15 PROCEDURE — 80053 COMPREHEN METABOLIC PANEL: CPT | Performed by: PHYSICIAN ASSISTANT

## 2019-03-15 PROCEDURE — 85025 COMPLETE CBC W/AUTO DIFF WBC: CPT | Performed by: PHYSICIAN ASSISTANT

## 2019-03-15 PROCEDURE — 99239 HOSP IP/OBS DSCHRG MGMT >30: CPT | Performed by: PHYSICIAN ASSISTANT

## 2019-03-15 PROCEDURE — 84100 ASSAY OF PHOSPHORUS: CPT | Performed by: PHYSICIAN ASSISTANT

## 2019-03-15 PROCEDURE — 25010000002 FUROSEMIDE PER 20 MG: Performed by: PHYSICIAN ASSISTANT

## 2019-03-15 PROCEDURE — 99232 SBSQ HOSP IP/OBS MODERATE 35: CPT | Performed by: INTERNAL MEDICINE

## 2019-03-15 PROCEDURE — 83735 ASSAY OF MAGNESIUM: CPT | Performed by: PHYSICIAN ASSISTANT

## 2019-03-15 PROCEDURE — 83880 ASSAY OF NATRIURETIC PEPTIDE: CPT | Performed by: PHYSICIAN ASSISTANT

## 2019-03-15 RX ORDER — POTASSIUM CHLORIDE 750 MG/1
10 TABLET, EXTENDED RELEASE ORAL DAILY
Qty: 3 TABLET | Refills: 0 | Status: SHIPPED | OUTPATIENT
Start: 2019-03-16 | End: 2019-03-19

## 2019-03-15 RX ORDER — AMIODARONE HYDROCHLORIDE 200 MG/1
200 TABLET ORAL DAILY
Qty: 30 TABLET | Refills: 6 | Status: SHIPPED | OUTPATIENT
Start: 2019-04-04 | End: 2019-04-03 | Stop reason: SDUPTHER

## 2019-03-15 RX ORDER — AMIODARONE HYDROCHLORIDE 200 MG/1
200 TABLET ORAL EVERY 12 HOURS
Qty: 28 TABLET | Refills: 0 | Status: SHIPPED | OUTPATIENT
Start: 2019-03-21 | End: 2019-04-09

## 2019-03-15 RX ORDER — AMIODARONE HYDROCHLORIDE 200 MG/1
200 TABLET ORAL EVERY 8 HOURS
Qty: 18 TABLET | Refills: 0 | Status: SHIPPED | OUTPATIENT
Start: 2019-03-15 | End: 2019-03-21

## 2019-03-15 RX ORDER — AMIODARONE HYDROCHLORIDE 200 MG/1
200 TABLET ORAL DAILY
Qty: 30 TABLET | Refills: 1 | Status: CANCELLED | OUTPATIENT
Start: 2019-04-04

## 2019-03-15 RX ORDER — AMIODARONE HYDROCHLORIDE 200 MG/1
200 TABLET ORAL EVERY 12 HOURS
Qty: 28 TABLET | Refills: 0 | Status: CANCELLED | OUTPATIENT
Start: 2019-03-21 | End: 2019-04-04

## 2019-03-15 RX ORDER — DOXYCYCLINE HYCLATE 100 MG/1
100 CAPSULE ORAL 2 TIMES DAILY
Qty: 10 CAPSULE | Refills: 0 | Status: SHIPPED | OUTPATIENT
Start: 2019-03-15 | End: 2019-03-20

## 2019-03-15 RX ORDER — FUROSEMIDE 20 MG/1
20 TABLET ORAL DAILY
Qty: 3 TABLET | Refills: 0 | Status: SHIPPED | OUTPATIENT
Start: 2019-03-16 | End: 2019-03-19

## 2019-03-15 RX ORDER — AMIODARONE HYDROCHLORIDE 200 MG/1
200 TABLET ORAL EVERY 8 HOURS
Qty: 18 TABLET | Refills: 0 | Status: CANCELLED | OUTPATIENT
Start: 2019-03-15 | End: 2019-03-21

## 2019-03-15 RX ADMIN — FUROSEMIDE 20 MG: 10 INJECTION, SOLUTION INTRAMUSCULAR; INTRAVENOUS at 04:50

## 2019-03-15 RX ADMIN — AMIODARONE HYDROCHLORIDE 200 MG: 200 TABLET ORAL at 04:50

## 2019-03-15 RX ADMIN — METOPROLOL TARTRATE 25 MG: 25 TABLET, FILM COATED ORAL at 08:07

## 2019-03-15 RX ADMIN — APIXABAN 5 MG: 5 TABLET, FILM COATED ORAL at 08:07

## 2019-03-15 RX ADMIN — DOXYCYCLINE 100 MG: 100 INJECTION, POWDER, LYOPHILIZED, FOR SOLUTION INTRAVENOUS at 08:08

## 2019-03-15 RX ADMIN — SODIUM CHLORIDE, PRESERVATIVE FREE 3 ML: 5 INJECTION INTRAVENOUS at 08:11

## 2019-03-15 RX ADMIN — DILTIAZEM HYDROCHLORIDE 30 MG: 30 TABLET, FILM COATED ORAL at 08:07

## 2019-03-15 RX ADMIN — ASPIRIN 81 MG: 81 TABLET ORAL at 08:07

## 2019-03-15 RX ADMIN — CYANOCOBALAMIN TAB 1000 MCG 1000 MCG: 1000 TAB at 08:07

## 2019-03-15 RX ADMIN — Medication 1 CAPSULE: at 08:07

## 2019-03-15 NOTE — DISCHARGE INSTR - APPOINTMENTS
ot  Has  An  Apt   With   Zahraa Nails  For  March 22  10 am   And  Cardiology   Closes  At  12  Noon  On  Friday  Will  Call  On  Monday  And  Get apt  And  Call  The patient

## 2019-03-15 NOTE — PROGRESS NOTES
C[facility]  INPATIENT PROGRESS NOTE         [address]    3/15/2019      PATIENT IDENTIFICATION:   Name:  Kayleen Brasher      MRN:  [id number]     87 y.o.  female             Reason for visit: Persistent atrial fibrillation    24 hour: No new events    SUBJECTIVE:    The patient has been stable overnight.  Erythema, induration from amiodarone infiltration seems to be stable, possibly slightly better.  Heart rate stable.  She is not wearing her telemetry monitor overnight but as of 1 AM she was in sinus rhythm.  OBJECTIVE:  Vitals:    03/14/19 1810 03/14/19 1838 03/15/19 0300 03/15/19 0500   BP: 133/40  138/59 135/59   BP Location: Right arm  Right arm    Patient Position: Lying  Lying    Pulse: 95 92 103 99   Resp: 18 18 18 18   Temp: 97.5 °F (36.4 °C)  98 °F (36.7 °C) 97.7 °F (36.5 °C)   TempSrc: Oral  Oral    SpO2: 94% 94% 97% 96%   Weight:   89.6 kg (197 lb 8 oz)    Height:               Body mass index is 36.12 kg/m².    Intake/Output Summary (Last 24 hours) at 3/15/2019 1042  Last data filed at 3/15/2019 0300  Gross per 24 hour   Intake 360 ml   Output 400 ml   Net -40 ml       Telemetry: Sinus rhythm      Exam:  General appearance no acute distress    well nourished   HEENT sclera non-icteric    lips not cyanotic   Respiratory rate and depth normal    normal breath sounds    no rales, no wheeze   Cardiovascular JVP normal    regular rhythm    S1 normal, S2 normal    no S3, no S4    no murmur    lower extremity edema:none  Left upper extremity forearm erythema and induration, stable   Abdominal bowel sounds normal    abdomen soft, non-tender    no hepatosplenomegaly   Neuro-psych  moving all extremities, no focal deficits         Allergies   Allergen Reactions   • Sulfa Antibiotics      Scheduled meds:      amiodarone 200 mg Oral Q8H   Followed by      [START ON 3/21/2019] amiodarone 200 mg Oral Q12H   Followed by      [START ON 4/4/2019] amiodarone 200 mg Oral Daily   apixaban 5 mg Oral Q12H    aspirin 81 mg Oral Daily   diltiaZEM 30 mg Oral Q12H   doxycycline 100 mg Intravenous Q12H   furosemide 20 mg Intravenous Q12H   lactobacillus acidophilus 1 capsule Oral Daily   metoprolol tartrate 25 mg Oral Q12H   sodium chloride 3 mL Intravenous Q12H   vitamin B-12 1,000 mcg Oral Daily     IV meds:                          Pharmacy Consult      Data Review:  Results from last 7 days   Lab Units 03/15/19  0411 03/14/19  0337 03/13/19  0753 03/12/19  0427   SODIUM mmol/L 137 140 140 139   BUN mg/dL 10 9 9 10   CREATININE mg/dL 0.59 0.65 0.61 0.63   GLUCOSE mg/dL 100* 103* 110* 105*             Estimated Creatinine Clearance: 51.5 mL/min (by C-G formula based on SCr of 0.59 mg/dL).  Results from last 7 days   Lab Units 03/15/19  0411 03/14/19  0337 03/13/19  0753 03/12/19  0427 03/11/19  1115   WBC 10*3/mm3 13.34* 10.30 10.02 12.84* 10.44   HEMOGLOBIN g/dL 12.8 12.6 13.4 14.1 14.1     Results from last 7 days   Lab Units 03/11/19  1733 03/11/19  1115   INR  1.21* 1.18*     Results from last 7 days   Lab Units 03/15/19  0411 03/13/19  0753 03/12/19  0427 03/11/19  1115   ALT (SGPT) U/L 11 6 11 12   AST (SGOT) U/L 32 22 28 28     No results found for: DIGOXIN   Lab Results   Component Value Date    TSH 0.530 03/11/2019           Invalid input(s): LDLCALC            Imaging (last 24 hr):   Imaging Results (last 24 hours)     ** No results found for the last 24 hours. **            ASSESSMENT:     Paroxysmal atrial fibrillation (CMS/HCC)    COPD with acute exacerbation (CMS/HCC)    Other specified hypothyroidism    Essential hypertension    Lower extremity cellulitis            PLAN:  Oral amiodarone with taper  Oral anticoagulation  Stable for discharge from my standpoint  Will try to arrange a clinic visit with me in 2 weeks          Judson Zhang MD  3/15/2019    10:42 AM

## 2019-03-15 NOTE — DISCHARGE SUMMARY
[facility] Medicine Services  DISCHARGE SUMMARY    Patient Identification:  Name:  Kayleen Brasher  Age:  87 y.o.  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 on 3/13/2019  · IV extravasation of amiodarone in LUE with cellulitis and edema  · Bilateral lower extremity edema and cellulitis, likely venous stasis dermatitis  · Leukocytosis, stable and likely reactive  · Elevated proBNP with EF > 70% and left ventricular diastolic dysfunction, improved at time of discharge  · COPD without acute exacerbation  · Hypothyroidism not on medical therapy, TSH within normal limits  · Essential hypertension, controlled  · Macrocytosis without anemia  · Elevated vitamin D level on oral supplementation, supplementation test discontinued  · Hypoalbuminemia  · Former smoker    Consults/Procedures     Consults:   · Cardiology -- Dr. Zhang    Procedures Performed:  · 3/11/2019: Schafer catheter insertion   · 3/13/2019: TODD with synchronized external cardioversion -- Dr. Zhang, cardiology   · 3/14/2019: Schafer catheter removal     History of Presenting Illness     Kayleen Brasher is a 87 y.o. female with past medical history significant for essential hypertension, non-oxygen dependent COPD, hypothyroidism, and advanced age that presented to the [facility] emergency department for evaluation of tachycardia, lower extremity edema and erythema with cellulitis.  Patient was seen prior to presentation by PCP, was sent to ED as her heart rate was significantly elevated and irregular.  Please see the admitting history and physical for further details. Patient was found  to have new onset atrial fibrillation with rapid ventricular response and bilateral lower extremity cellulitis with edema.    Hospital Course     Patient was admitted to the telemetry floor for further evaluation and treatment.  For patient's lower extremity cellulitis, blood cultures were obtained prior to antibiotic administration.  Patient was empirically treated with IV doxycycline.  Patient was instructed on elevating extremities to decrease swelling.  Patient was also treated with IV Lasix 20 mg twice daily.  proBNP was elevated on admission with no history of heart failure.  Transthoracic echocardiogram was obtained revealing EF of greater than 70% with component of left ventricular diastolic dysfunction.  Patient diuresed well and tolerated Lasix.     For new onset atrial fibrillation with RVR, patient was given a bolus of IV Cardizem and a dose of digoxin and metoprolol IV in the emergency department.  Patient was continued on a IV Cardizem drip and was started on a heparin drip for anticoagulation due to elevated [id number] score of at least 4.  Drip was discontinued and patient was started on oral Eliquis 5 mg twice daily. Cardiology was consulted and evaluated the patient.  Patient's Cardizem drip was titrated, she was weaned off of the drip and started on p.o. Cardizem.  She was also initiated on metoprolol p.o. therapy.  Her home Coreg was discontinued.  Patient underwent TODD with synchronized cardioversion by Dr. Zhang with cardiology and tolerated the procedure well.  She converted to sinus rhythm after cardioversion.  She was loaded with IV amiodarone and transitioned to p.o.  Cardiac wise, patient was stable and cardiology signed off to follow up in the outpatient setting in 2 weeks post discharge.  Patient's IV amiodarone did extravasate into the soft tissue of the left upper extremity/forearm, patient did have a subsequent reaction with edema and erythema around the IV site.  IV was removed,  warm compresses were applied.  Patient instructed to keep arm elevated.    On day of discharge, it was felt that patient had reached maximal inpatient benefit and was stable for discharge.  Patient was ambulated and her heart rate did not significantly elevate.  She remained in normal sinus rhythm and rate controlled.  She was eager to be discharged home.  Her left forearm was still edematous and erythematous from extravasation, but was stable.  Patient educated to keep alternating with warm and cool compresses and to keep arm elevated.  Patient's bilateral lower extremity edema was significantly improved as well as the cellulitis.  Patient continued on 5 more days of p.o. doxycycline for cellulitis.  20 mg p.o. Lasix daily times 3 days with 10 mEq p.o. potassium also prescribed.  Patient's medications were E prescribed.  Patient to follow-up with PCP in 1 week with recommendation for BMP and CBC as patient will be sent home on Lasix and Eliquis.  Patient to follow-up with cardiology in 2 weeks.  Patient educated on signs and symptoms concerning warranting return to ED.  Patient's granddaughter was at bedside during discussion, she was in agreement with plan.    Discharge Vitals/Physical Examination     Vital Signs:  Temp:  [97.5 °F (36.4 °C)-98 °F (36.7 °C)] 97.7 °F (36.5 °C)  Heart Rate:  [] 84  Resp:  [18] 18  BP: (133-138)/(40-62) 134/62  Mean Arterial Pressure (Non-Invasive) for the past 24 hrs (Last 3 readings):   Noninvasive MAP (mmHg)   03/15/19 1122 88   03/15/19 0500 101   03/15/19 0300 92     SpO2 Percentage    03/15/19 0300 03/15/19 0500 03/15/19 1122   SpO2: 97% 96% 97%     SpO2:  [94 %-97 %] 97 %  on   ;   Device (Oxygen Therapy): room air    Body mass index is 36.12 kg/m².  Wt Readings from Last 3 Encounters:   03/15/19 89.6 kg (197 lb 8 oz)   11/26/16 90.7 kg (200 lb)       Physical Exam:  Constitutional: She is oriented to person, place, and time. She appears well-developed and well-nourished.   Non-toxic appearance. No distress.   Elderly, pleasant, in no acute distress    HENT:   Head: Normocephalic and atraumatic.   Right Ear: External ear normal.   Left Ear: External ear normal.   Nose: Nose normal.   Mouth/Throat: Oropharynx is clear and moist and mucous membranes are normal. No oropharyngeal exudate.   Eyes: Conjunctivae and EOM are normal. Pupils are equal, round, and reactive to light. No scleral icterus.   Neck: Trachea normal and normal range of motion. Neck supple. No JVD present. No muscular tenderness present. Carotid bruit is not present. No tracheal deviation present. No thyromegaly present.   Cardiovascular: Normal rate, regular rhythm, normal heart sounds and intact distal pulses. Exam reveals no gallop and no friction rub.   No murmur heard.  Pulmonary/Chest: Effort normal. No respiratory distress. She has decreased breath sounds (slightly ) in the right lower field and the left lower field. She has no wheezes. She has no rhonchi. She has no rales. She exhibits no tenderness.   Abdominal: Soft. Bowel sounds are normal. She exhibits no distension and no mass. There is no hepatosplenomegaly. There is no tenderness. There is no rebound and no guarding. No hernia.   Genitourinary: No dixon catheter in place, making urine.   Musculoskeletal: She exhibits no deformity.        Left upper arm: She exhibits tenderness, swelling and edema.        Right lower leg: She exhibits tenderness and edema (1+ Bilateral lower extremities from above ankle to right below the knee -- Improved).        Left lower leg: She exhibits tenderness and edema (1+ Bilateral lower extremities from above ankle to right below the knee -- Improved).   LUE edema and erythema with area of cellulitis from IV extravasation, area circled with black marker for monitoring    Vascular Status -  Her right foot exhibits abnormal foot edema. Her right foot exhibits normal foot vasculature . Her left foot exhibits abnormal foot edema.  Her left foot exhibits normal foot vasculature .  Neurological: She is alert and oriented to person, place, and time. She has normal strength. No cranial nerve deficit or sensory deficit.   Skin: Skin is warm and dry. Capillary refill takes less than 2 seconds. No rash noted. There is erythema (Bilateral lower extremities from above ankle to right below the knee -- significnatly improved, less erythematous today on exam, consistent with venous stasis). No pallor.   Psychiatric: She has a normal mood and affect. Her speech is normal and behavior is normal. Judgment and thought content normal.   Nursing note and vitals reviewed.    Pertinent Laboratory/Radiology Results     Pertinent Laboratory Results:  Results from last 7 days   Lab Units 03/12/19  0427 03/11/19  2219 03/11/19  1733   CK TOTAL U/L 28 31 29   CKMB ng/mL 1.03 1.06 1.16   TROPONIN T ng/mL <0.010 <0.010 <0.010   MYOGLOBIN ng/mL 36.8 42.1 48.7     Results from last 7 days   Lab Units 03/15/19  0411 03/14/19  0337 03/13/19  1236 03/11/19  1115   PROBNP pg/mL 2,049.0* 2,503.0* 4,334.0* 1,955.0*         Results from last 7 days   Lab Units 03/15/19  0411 03/14/19  0337 03/13/19  0753  03/11/19  1824 03/11/19  1733 03/11/19  1115   CRP mg/dL  --   --   --   --  0.40  --   --    LACTATE mmol/L  --   --   --   --   --   --  1.7   WBC 10*3/mm3 13.34* 10.30 10.02   < >  --   --  10.44   HEMOGLOBIN g/dL 12.8 12.6 13.4   < >  --   --  14.1   HEMATOCRIT % 40.1 40.7 43.4   < >  --   --  44.8   MCV fL 98.3* 99.8* 99.3*   < >  --   --  98.7*   MCHC g/dL 31.9 31.0* 30.9*   < >  --   --  31.5   PLATELETS 10*3/mm3 186 167 190   < >  --   --  190   INR   --   --   --   --   --  1.21* 1.18*    < > = values in this interval not displayed.     Results from last 7 days   Lab Units 03/15/19  0411 03/14/19  0337 03/13/19  0753 03/12/19  0427   SODIUM mmol/L 137 140 140 139   POTASSIUM mmol/L 3.9 3.7 4.3 4.8   MAGNESIUM mg/dL 1.8 1.8 2.0 2.0   CHLORIDE mmol/L 99 102 102 105    CO2 mmol/L 28.3 28.7 31.4* 26.3   BUN mg/dL 10 9 9 10   CREATININE mg/dL 0.59 0.65 0.61 0.63   EGFR IF NONAFRICN AM mL/min/1.73 96 86 93 89   CALCIUM mg/dL 8.3* 8.2* 8.6 8.3*   GLUCOSE mg/dL 100* 103* 110* 105*   ALBUMIN g/dL 2.43*  --  2.67* 2.66*   BILIRUBIN mg/dL 0.6  --  0.5 0.7   ALK PHOS U/L 87  --  89 94   AST (SGOT) U/L 32  --  22 28   ALT (SGPT) U/L 11  --  6 11   Estimated Creatinine Clearance: 51.5 mL/min (by C-G formula based on SCr of 0.59 mg/dL).    Lab Results   Component Value Date    TSH 0.530 03/11/2019      Microbiology Results (last 10 days)     Procedure Component Value - Date/Time    Blood Culture - Blood, Arm, Left [[id number]] Collected:  03/11/19 1122    Lab Status:  Preliminary result Specimen:  Blood from Arm, Left Updated:  03/14/19 1146     Blood Culture No growth at 3 days    Blood Culture - Blood, Arm, Right [[id number]] Collected:  03/11/19 1115    Lab Status:  Preliminary result Specimen:  Blood from Arm, Right Updated:  03/14/19 1146     Blood Culture No growth at 3 days        Pertinent Radiology Results:  Imaging Results (all)     Procedure Component Value Units Date/Time    US Venous Doppler Lower Extremity Bilateral (duplex) [[id number]] Collected:  03/12/19 0940     Updated:  03/12/19 0943    Narrative:       FINDINGS: The visualized deep veins demonstrate flow and are  compressible. No evidence of deep venous thrombosis.     Impression:       No DVT.     This report was finalized on 3/12/2019 9:40 AM by Dr. Gregg Stapleton MD.    XR Chest 1 View [[id number]] Collected:  03/11/19 1051     Updated:  03/11/19 1150    Narrative:       FINDINGS:   The lungs remain aerated.  Heart and mediastinum contours are unremarkable.  SMALL LEFT PLEURAL EFFUSION.  No pneumothorax.   Bony and soft tissue structures are unremarkable.    Impression:       SMALL LEFT PLEURAL EFFUSION.     This report was finalized on 3/11/2019 10:51 AM by Dr. Gregg Stapleton MD.     Transthoracic Echocardiogram  3/12/2019     Cardioversion Operative Report     Test Results Pending at Discharge:   Order Current Status    Blood Culture - Blood, Arm, Left Preliminary result    Blood Culture - Blood, Arm, Right Preliminary result        Discharge Disposition/Discharge Medications/Discharge Appointments     Discharge Disposition:   Home or Self Care    Condition at Discharge:  Stable     DME Prescribed at Discharge:  None     Discharge Diet:  Diet Instructions     Diet: Regular, Cardiac      Discharge Diet:   Regular  Cardiac           Discharge Activity:  Activity Instructions     Activity as Tolerated      Measure Blood Pressure      Monitor BP and HR daily, keep a log and take to PCP appointment        Code Status While Inpatient:  Code Status and Medical Interventions:   Ordered at: 03/12/19 0807     Limited Support to NOT Include:    Artificial Nutrition     Level Of Support Discussed With:    Patient     Code Status:    CPR     Medical Interventions (Level of Support Prior to Arrest):    Limited     Comments:    patient states and  confirms that she does not want to be on mechanical ventilation for longer than 3 days. No artificial nutrition. She has to think about short-term hemodialysis. Blood products, vasopressors and NIPPV is permitted.     Discharge Medications:     Discharge Medications      New Medications      Instructions Start Date   amiodarone 200 MG tablet  Commonly known as:  PACERONE   200 mg, Oral, Every 8 Hours      amiodarone 200 MG tablet  Commonly known as:  PACERONE   200 mg, Oral, Every 12 Hours   Start Date:  3/21/2019     amiodarone 200 MG tablet  Commonly known as:  PACERONE   200 mg, Oral, Daily   Start Date:  4/4/2019     apixaban 5 MG tablet tablet  Commonly known as:  ELIQUIS   5 mg, Oral, Every 12 Hours Scheduled      diltiaZEM 30 MG tablet  Commonly known as:  CARDIZEM   30 mg, Oral, Every 12 Hours Scheduled      doxycycline 100 MG enteric coated tablet  Commonly known as:  DORYX   100  mg, Oral, 2 Times Daily      furosemide 20 MG tablet  Commonly known as:  LASIX   20 mg, Oral, Daily      metoprolol tartrate 25 MG tablet  Commonly known as:  LOPRESSOR   25 mg, Oral, Every 12 Hours Scheduled      potassium chloride 10 MEQ CR tablet  Commonly known as:  K-DUR,KLOR-CON   10 mEq, Oral, Daily         Continue These Medications      Instructions Start Date   aspirin 81 MG EC tablet   81 mg, Oral, Daily      vitamin B-12 1000 MCG tablet  Commonly known as:  CYANOCOBALAMIN   1,000 mcg, Oral, Daily         Stop These Medications    atenolol 50 MG tablet  Commonly known as:  TENORMIN     cholecalciferol 1000 units tablet  Commonly known as:  VITAMIN D3          Discharge Appointments:  Additional Instructions for the Follow-ups that You Need to Schedule     Call MD With Problems / Concerns   As directed      Call PCP or return to the ED with recurrent or worsening symptoms.    Order Comments:  Call PCP or return to the ED with recurrent or worsening symptoms.          Discharge Follow-up with PCP   As directed       Currently Documented PCP:    Tono Yang MD    PCP Phone Number:    [phone]     Follow Up Details:  1 week with BMP and CBC         Discharge Follow-up with Specialty: Cardiology; 2 Weeks   As directed      Specialty:  Cardiology    Follow Up:  2 Weeks    Follow Up Details:  Hospital follow up for new onset afib with RVR, started on amiodarone and Eliquis         Basic Metabolic Panel    Mar 22, 2019 (Approximate)      Results to be followed by PCP: Dr. Yang    Order Comments:  Results to be followed by PCP: Dr. Yang          CBC & Differential    Mar 22, 2019 (Approximate)      Results to be followed by PCP: Dr. Yang    Order Comments:  Results to be followed by PCP: Dr. Yang     Manual Differential:  No               Attestation: I personally discussed the patient's hospital course, disposition, discharge planning, and discharge medications with attending physician,   Lisa Beyer DO, prior to time of discharge.       Alejandra Mcpherson PA-C  Hospitalist Service -- [facility]       03/15/19  11:39 AM    Discharge Time: Greater than 30 minutes     Please send a copy of this dictation to the following providers:  Tono Yang MD

## 2019-03-15 NOTE — PROGRESS NOTES
Discharge Planning Assessment  JORGITO Bonds     Patient Name: Kayleen Brasher  MRN: [id number]  Today's Date: 3/15/2019    Admit Date: 3/11/2019      Discharge Plan     Row Name 03/15/19 1324       Plan    Final Discharge Disposition Code  01 - home or self-care    Final Note  Pt to be discharged home.             Zahraa Herrera

## 2019-03-16 ENCOUNTER — READMISSION MANAGEMENT (OUTPATIENT)
Dept: CALL CENTER | Facility: HOSPITAL | Age: 84
End: 2019-03-16

## 2019-03-16 LAB
BACTERIA SPEC AEROBE CULT: NORMAL
BACTERIA SPEC AEROBE CULT: NORMAL

## 2019-03-16 NOTE — OUTREACH NOTE
Prep Survey      Responses   Facility patient discharged from?  [facility]   Is patient eligible?  Yes   Discharge diagnosis  New onset Afib. With RVR converted to SR following TODD with Syn. Cardioversion on 03/13/19, IV extravasation of Amiodarone in LUE with cellulitis and edema, BLE edema and cellulitlis, Leukocytosis, Elev. proBNP with EF >70% and left vent. diastolic dysfunction, COPD w/o AE, hypothyroidism, essential HTN, Macrocytosis w/o anemia, Elevated Vit D, hypoalbuminemia, former smoker   Does the patient have one of the following disease processes/diagnoses(primary or secondary)?  CHF   Does the patient have Home health ordered?  No   Is there a DME ordered?  No   Comments regarding appointments  See AVS   Prep survey completed?  Yes          Daysi Arnold RN

## 2019-03-18 ENCOUNTER — READMISSION MANAGEMENT (OUTPATIENT)
Dept: CALL CENTER | Facility: HOSPITAL | Age: 84
End: 2019-03-18

## 2019-03-18 NOTE — OUTREACH NOTE
CHF Week 1 Survey      Responses   Facility patient discharged from?  [facility]   Does the patient have one of the following disease processes/diagnoses(primary or secondary)?  CHF   Is there a successful TCM telephone encounter documented?  No   CHF Week 1 attempt successful?  No   Unsuccessful attempts  Attempt 1          Neisha Costa RN

## 2019-03-19 ENCOUNTER — READMISSION MANAGEMENT (OUTPATIENT)
Dept: CALL CENTER | Facility: HOSPITAL | Age: 84
End: 2019-03-19

## 2019-03-19 NOTE — OUTREACH NOTE
CHF Week 1 Survey      Responses   Facility patient discharged from?  [facility]   Does the patient have one of the following disease processes/diagnoses(primary or secondary)?  CHF   Is there a successful TCM telephone encounter documented?  No   CHF Week 1 attempt successful?  Yes   Call start time  0933   Call end time  0939   Discharge diagnosis  New onset Afib. With RVR converted to SR following TODD with Syn. Cardioversion on 03/13/19, IV extravasation of Amiodarone in LUE with cellulitis and edema, BLE edema and cellulitlis, Leukocytosis, Elev. proBNP with EF >70% and left vent. diastolic dysfunction, COPD w/o AE, hypothyroidism, essential HTN, Macrocytosis w/o anemia, Elevated Vit D, hypoalbuminemia, former smoker   Person spoke with today (if not patient) and relationship  Javier-   Meds reviewed with patient/caregiver?  Yes   Is the patient having any side effects they believe may be caused by any medication additions or changes?  No   Does the patient have all medications ordered at discharge?  Yes   Is the patient taking all medications as directed (includes completed medication regime)?  Yes   Does the patient have a primary care provider?   Yes   Does the patient have an appointment with their PCP within 7 days of discharge?  Yes   Comments  Will see cardiologist on 3/28, verified   What is the Home health agency?   [facility]   Has home health visited the patient within 72 hours of discharge?  Yes   Psychosocial issues?  No   Did the patient receive a copy of their discharge instructions?  Yes   Nursing interventions  Reviewed instructions with patient   What is the patient's perception of their health status since discharge?  Improving   Nursing interventions  Nurse provided patient education   Is the patient weighing daily?  Yes   Does the patient have scales?  Yes   Daily weight interventions  Education provided on importance of daily weight   Is the patient able to teach back Heart Failure diet  management?  Yes   Is the patient able to teach back Heart Failure Zones?  Yes   Is the patient able to teach back signs and symptoms of worsening condition? (i.e. weight gain, shortness of air, etc.)  Yes   Additional teach back comments  Continues to use hot and cold packs to cellulitis areas and improving    CHF Week 1 call completed?  Yes          Daysi Rivers RN

## 2019-03-26 ENCOUNTER — READMISSION MANAGEMENT (OUTPATIENT)
Dept: CALL CENTER | Facility: HOSPITAL | Age: 84
End: 2019-03-26

## 2019-03-26 NOTE — OUTREACH NOTE
CHF Week 2 Survey      Responses   Facility patient discharged from?  [facility]   Does the patient have one of the following disease processes/diagnoses(primary or secondary)?  CHF   Week 2 attempt successful?  Yes   Call start time  1323   Call end time  1333   Discharge diagnosis  New onset Afib. With RVR converted to SR following TODD with Syn. Cardioversion on 03/13/19, IV extravasation of Amiodarone in LUE with cellulitis and edema, BLE edema and cellulitlis, Leukocytosis, Elev. proBNP with EF >70% and left vent. diastolic dysfunction, COPD w/o AE, hypothyroidism, essential HTN, Macrocytosis w/o anemia, Elevated Vit D, hypoalbuminemia, former smoker   Meds reviewed with patient/caregiver?  Yes   Is the patient having any side effects they believe may be caused by any medication additions or changes?  No   Does the patient have all medications ordered at discharge?  Yes   Is the patient taking all medications as directed (includes completed medication regime)?  Yes   Does the patient have a primary care provider?   Yes   Does the patient have an appointment with their PCP within 7 days of discharge?  Yes   Comments regarding PCP  Dr. Yang   Has the patient kept scheduled appointments due by today?  Yes   What is the Home health agency?   [facility]   Has home health visited the patient within 72 hours of discharge?  Yes   Psychosocial issues?  No   Did the patient receive a copy of their discharge instructions?  Yes   Nursing interventions  Reviewed instructions with patient   What is the patient's perception of their health status since discharge?  Improving   Nursing interventions  Nurse provided patient education   Is the patient weighing daily?  No   Does the patient have scales?  Yes   Daily weight interventions  Education provided on importance of daily weight   Is the patient able to teach back Heart Failure diet management?  Yes   Is the patient able to teach back Heart Failure Zones?  Yes   Is the patient able  "to teach back signs and symptoms of worsening condition? (i.e. weight gain, shortness of air, etc.)  Yes   Is the patient/caregiver able to teach back the hierarchy of who to call/visit for symptoms/problems? PCP, Specialist, Home health nurse, Urgent Care, ED, 911  Yes   Additional teach back comments  States she is green zone today.    CHF Week 2 call completed?  Yes   Wrap up additional comments  States she is doing well. Denies any needs. States no swelling or shortness of breath. States BP and HR are \"normal\".           Shantel Pastor RN  "

## 2019-04-03 ENCOUNTER — READMISSION MANAGEMENT (OUTPATIENT)
Dept: CALL CENTER | Facility: HOSPITAL | Age: 84
End: 2019-04-03

## 2019-04-03 ENCOUNTER — OFFICE VISIT (OUTPATIENT)
Dept: CARDIOLOGY | Facility: CLINIC | Age: 84
End: 2019-04-03

## 2019-04-03 VITALS
HEIGHT: 63 IN | SYSTOLIC BLOOD PRESSURE: 101 MMHG | WEIGHT: 182.6 LBS | OXYGEN SATURATION: 95 % | DIASTOLIC BLOOD PRESSURE: 60 MMHG | BODY MASS INDEX: 32.36 KG/M2 | HEART RATE: 59 BPM

## 2019-04-03 DIAGNOSIS — E66.01 MORBIDLY OBESE (HCC): ICD-10-CM

## 2019-04-03 DIAGNOSIS — I10 ESSENTIAL HYPERTENSION: ICD-10-CM

## 2019-04-03 DIAGNOSIS — Z79.899 LONG TERM CURRENT USE OF AMIODARONE: ICD-10-CM

## 2019-04-03 DIAGNOSIS — I48.0 PAROXYSMAL ATRIAL FIBRILLATION (HCC): Primary | ICD-10-CM

## 2019-04-03 PROCEDURE — 99214 OFFICE O/P EST MOD 30 MIN: CPT | Performed by: INTERNAL MEDICINE

## 2019-04-03 RX ORDER — AMIODARONE HYDROCHLORIDE 200 MG/1
200 TABLET ORAL DAILY
Qty: 30 TABLET | Refills: 6 | Status: SHIPPED | OUTPATIENT
Start: 2019-04-04 | End: 2019-08-06 | Stop reason: SDUPTHER

## 2019-04-03 NOTE — OUTREACH NOTE
CHF Week 3 Survey      Responses   Facility patient discharged from?  [facility]   Does the patient have one of the following disease processes/diagnoses(primary or secondary)?  CHF   Week 3 attempt successful?  Yes   Call start time  0942   Call end time  0944   Discharge diagnosis  New onset Afib. With RVR converted to SR following TODD with Syn. Cardioversion on 03/13/19, IV extravasation of Amiodarone in LUE with cellulitis and edema, BLE edema and cellulitlis, Leukocytosis, Elev. proBNP with EF >70% and left vent. diastolic dysfunction, COPD w/o AE, hypothyroidism, essential HTN, Macrocytosis w/o anemia, Elevated Vit D, hypoalbuminemia, former smoker   Meds reviewed with patient/caregiver?  Yes   Is the patient having any side effects they believe may be caused by any medication additions or changes?  No   Does the patient have all medications ordered at discharge?  Yes   Is the patient taking all medications as directed (includes completed medication regime)?  Yes   Does the patient have a primary care provider?   Yes   Does the patient have an appointment with their PCP within 7 days of discharge?  Yes   Has the patient kept scheduled appointments due by today?  Yes   What is the Home health agency?   [facility]   Has home health visited the patient within 72 hours of discharge?  Yes   Psychosocial issues?  No   Did the patient receive a copy of their discharge instructions?  Yes   Nursing interventions  Reviewed instructions with patient   What is the patient's perception of their health status since discharge?  Improving   Nursing interventions  Nurse provided patient education   Is the patient weighing daily?  No   Does the patient have scales?  Yes   Daily weight interventions  Education provided on importance of daily weight   Is the patient able to teach back Heart Failure diet management?  Yes   Is the patient able to teach back Heart Failure Zones?  Yes   Is the patient able to teach back signs and symptoms of  worsening condition? (i.e. weight gain, shortness of air, etc.)  Yes   Is the patient/caregiver able to teach back the hierarchy of who to call/visit for symptoms/problems? PCP, Specialist, Home health nurse, Urgent Care, ED, 911  Yes   CHF Week 3 call completed?  Yes   Revoked  No further contact(revokes)-requires comment   Graduated/Revoked comments  She states she is continuing to do better and does not feel future calls are needed.           Autumn Pastor RN

## 2019-04-03 NOTE — PROGRESS NOTES
Name: Kayleen Brasher    Date: 4/3/2019  MRN:  [id number]  :  1931      REFERRING/PRIMARY PROVIDER:  No ref. provider found    Chief Complaint   Patient presents with   • Atrial Fibrillation     [facility] hospital follow up (Afib w/RVR)   • Med Management     bottles presented   • Other     Pt presented home BP log       HPI: Kayleen Brasher is a 87 y.o. female who presents today for evaluation and management of persistent atrial fibrillation, hypertension, chronic diastolic heart failure, and hospital follow-up    Kayleen is doing well since hospital discharge.  She is compliant with her medications.  No bleeding problems and anticoagulation.  Her amiodarone dose will reduce to once daily tomorrow.  She feels that she has more energy, her  has noticed that she is more functional.  She denies water weight gain.  He is checking her blood pressure frequently at home and her pulse rate has been very steady.  She denies palpitations.    Past Medical History:   Diagnosis Date   • COPD (chronic obstructive pulmonary disease) (CMS/[facility])    • Disease of thyroid gland    • Hypertension    • Osteoporosis        Past Surgical History:   Procedure Laterality Date   • ELECTRICAL CARDIOVERSION  3/13/2019            Social History     Socioeconomic History   • Marital status:      Spouse name: Not on file   • Number of children: Not on file   • Years of education: Not on file   • Highest education level: Not on file   Tobacco Use   • Smoking status: Former Smoker   Substance and Sexual Activity   • Alcohol use: No   • Drug use: No       No family history on file.     ROS:   Review of Systems   Constitution: Negative.   HENT: Negative.    Cardiovascular: Negative.    Respiratory: Negative.    Skin: Negative.    Musculoskeletal: Positive for arthritis.   Gastrointestinal: Negative.    Neurological: Negative.        Allergies   Allergen Reactions   • Sulfa Antibiotics          Current Outpatient Medications:   •   "amiodarone (PACERONE) 200 MG tablet, Take 1 tablet by mouth Every 12 (Twelve) Hours for 28 doses., Disp: 28 tablet, Rfl: 0  •  apixaban (ELIQUIS) 5 MG tablet tablet, Take 1 tablet by mouth Every 12 (Twelve) Hours., Disp: 60 tablet, Rfl: 0  •  aspirin 81 MG EC tablet, Take 81 mg by mouth Daily., Disp: , Rfl:   •  diltiaZEM (CARDIZEM) 30 MG tablet, Take 1 tablet by mouth Every 12 (Twelve) Hours., Disp: 60 tablet, Rfl: 1  •  metoprolol tartrate (LOPRESSOR) 25 MG tablet, Take 1 tablet by mouth Every 12 (Twelve) Hours., Disp: 60 tablet, Rfl: 1  •  vitamin B-12 (CYANOCOBALAMIN) 1000 MCG tablet, Take 1,000 mcg by mouth Daily., Disp: , Rfl:   •  [START ON 4/4/2019] amiodarone (PACERONE) 200 MG tablet, Take 1 tablet by mouth Daily., Disp: 30 tablet, Rfl: 6    Vitals:    04/03/19 0930   BP: 101/60   BP Location: Right arm   Patient Position: Sitting   Cuff Size: Adult   Pulse: 59   SpO2: 95%   Weight: 82.8 kg (182 lb 9.6 oz)   Height: 160 cm (63\")     Body mass index is 32.35 kg/m².      Patient's Body mass index is 32.35 kg/m². BMI is above normal parameters. Recommendations include: exercise counseling and nutrition counseling.             PHYSICAL EXAM:    General Appearance:   · well developed  · well nourished  HENT:   · oropharynx moist  · lips not cyanotic  Neck:  · thyroid not enlarged  · supple  Respiratory:  · no respiratory distress  · normal breath sounds  · no rales  Cardiovascular:  · no jugular venous distention  · regular rhythm  · apical impulse normal  · S1 normal, S2 normal  · no S3, no S4   · no murmur  · no rub, no thrill  · carotid pulses normal; no bruit  · pedal pulses normal  · lower extremity edema: none    Gastrointestinal:   · bowel sounds normal  · non-tender  · no hepatomegaly, no splenomegaly  Musculoskeletal:  · no clubbing of fingers.   · normocephalic, head atraumatic  Skin:   · warm, dry  Psychiatric:  · judgement and insight appropriate  · normal mood and affect    RESULTS: "        Labs:        ASSESSMENT:  Encounter Diagnoses   Name Primary?   • Paroxysmal atrial fibrillation (CMS/HCC) Yes   • Essential hypertension    • Long term current use of amiodarone    • Morbidly obese (CMS/HCC)          PLAN:    Kayleen is doing well, much improved since medical therapy adjustment and cardioversion.  She seems to be maintaining sinus rhythm.  She is stable on her current medical therapy.  Her amiodarone will reduce to maintenance dosing starting tomorrow.  Continue anticoagulation long-term.  She will follow-up in about 5 months, at that point we can do amiodarone surveillance testing.  She will call if she has other problems come up in the meantime.          ZAN Zhang MD  4/3/2019    9:34 AM

## 2019-04-09 RX ORDER — ASPIRIN 81 MG/1
81 TABLET ORAL DAILY
Qty: 30 TABLET | Refills: 4 | Status: SHIPPED | OUTPATIENT
Start: 2019-04-09 | End: 2019-08-06 | Stop reason: SDUPTHER

## 2019-04-09 RX ORDER — ASPIRIN 81 MG/1
81 TABLET ORAL DAILY
Qty: 30 TABLET | Refills: 4 | Status: SHIPPED | OUTPATIENT
Start: 2019-04-09 | End: 2019-04-09 | Stop reason: SDUPTHER

## 2019-04-09 NOTE — TELEPHONE ENCOUNTER
Refills req by pharmacy for Eliquis 5 mg po bid #60 and Aspirin 81 mg po daily #30. Per last office note 4/3/19, Dr. Zhang recommended for pt to continue these medications and follow up in 5 months. Will refill for pt.

## 2019-07-22 ENCOUNTER — OFFICE VISIT (OUTPATIENT)
Dept: SURGERY | Facility: CLINIC | Age: 84
End: 2019-07-22

## 2019-07-22 VITALS
HEIGHT: 63 IN | DIASTOLIC BLOOD PRESSURE: 73 MMHG | SYSTOLIC BLOOD PRESSURE: 112 MMHG | WEIGHT: 185.2 LBS | BODY MASS INDEX: 32.82 KG/M2 | HEART RATE: 73 BPM

## 2019-07-22 DIAGNOSIS — N64.89 NIPPLE CRUSTING: Primary | ICD-10-CM

## 2019-07-22 PROCEDURE — 99203 OFFICE O/P NEW LOW 30 MIN: CPT | Performed by: SURGERY

## 2019-07-22 NOTE — PROGRESS NOTES
Subjective   Kayleen Brasher is a 87 y.o. female here today for left breast discharge referred by Dr. Yang.(Patient does not have mammograms anymore.)    History of Present Illness  Ms. Brasher was seen in the office today for breast evaluation.  This is an 87-year-old female who presents with a one-month history of a nipple abnormality.  This developed in the left breast.  Patient states she does feel a mass.  She has had some crusting and some drainage from the nipple.  Kayleen has refused mammogram prior to today's visit because of prior discomfort with mammograms.  She does have a history of benign bilateral breast biopsies.  As far as risk factors go, Kayleen was 21 at the time that she had her first child, with an onset of menses at age 13.  There is no family history of breast or ovarian cancer.  The patient is postmenopausal and is not on hormone replacement therapy.  Patient is on Eliquis following an admission in March, 2019 where she had new onset atrial fibrillation.  Allergies   Allergen Reactions   • Sulfa Antibiotics      Current Outpatient Medications   Medication Sig Dispense Refill   • amiodarone (PACERONE) 200 MG tablet Take 1 tablet by mouth Daily. 30 tablet 6   • apixaban (ELIQUIS) 5 MG tablet tablet Take 1 tablet by mouth Every 12 (Twelve) Hours. 60 tablet 4   • aspirin 81 MG EC tablet Take 1 tablet by mouth Daily. 30 tablet 4   • Cyanocobalamin ER 1000 MCG tablet controlled-release Take 1 tablet by mouth Daily. 100 each 3   • diltiaZEM (CARDIZEM) 30 MG tablet Take 1 tablet by mouth Every 12 (Twelve) Hours. 60 tablet 6   • levothyroxine (SYNTHROID, LEVOTHROID) 100 MCG tablet Take 1 tablet by mouth Every Morning. 90 tablet 3   • metoprolol tartrate (LOPRESSOR) 25 MG tablet Take 1 tablet by mouth Every 12 (Twelve) Hours. 60 tablet 6   • vitamin B-12 (CYANOCOBALAMIN) 1000 MCG tablet Take 1,000 mcg by mouth Daily.     • amiodarone (PACERONE) 200 MG tablet Take 1 tablet by mouth Daily. 90 tablet 3   •  "apixaban (ELIQUIS) 5 MG tablet tablet Take 1 tablet by mouth 2 (Two) Times a Day. 180 tablet 3   • aspirin 81 MG EC tablet Take 1 tablet by mouth Daily. 100 tablet 3   • diltiaZEM (CARDIZEM) 30 MG tablet Take 1 tablet by mouth 2 (Two) Times a Day. 180 tablet 3   • metoprolol tartrate (LOPRESSOR) 25 MG tablet Take 1 tablet by mouth 2 (Two) Times a Day. 180 tablet 3     No current facility-administered medications for this visit.      Past Medical History:   Diagnosis Date   • Arthritis    • COPD (chronic obstructive pulmonary disease) (CMS/HCC)    • Disease of thyroid gland    • Hypertension    • Hypertension    • Osteoporosis      Past Surgical History:   Procedure Laterality Date   • ELECTRICAL CARDIOVERSION  3/13/2019        • KNEE SURGERY       Review of Systems  General: weight loss 10 lbs  Integumentary: non-healing wound  Eyes: negative  ENT: hearing loss  Respiratory: negative  Gastrointestinal: negative  Cardiovascular: negative  Neurological: negative  Psychiatric: negative  Hematologic/Lymphatic: negative  Genitourinary: frequent urination  Musculoskeletal: negative  Endocrine: negative  Breasts: nipple discharge        Objective   Ht 160 cm (63\")   Wt 84 kg (185 lb 3.2 oz)   BMI 32.81 kg/m²   Physical Exam  General:  This is a WD WN elderly female in no acute distress  Vital signs stable, afebrile  HEENT exam:  WNL. Sclera are anicteric.  EOMI  Neck:  supple, FROM.  No JVD.  Trachea midline.  No palpable thyroid nodules. No supraclavicular adenopathy  Lungs:  Respiratory effort normal. Auscultation: Clear, without wheezes, rhonchi, rales  Heart:  Regular rate and rhythm, without murmur, gallop, rub.  No pedal edema  Breasts: On visual inspection there is crusting and asymmetric erythema over most of the left nipple.  Examination of the right breast demonstrates no discrete mass, skin change, or axillary adenopathy.  With the exception of the skin changes of the left nipple no underlying masses " identified in the left breast.  There is no axillary adenopathy..  Abdomen: Nontender, without hepatosplenomegaly  Musculoskeletal:  muscle strength/tone is normal.    Psyc:  alert, oriented x 3.  Mood and affect are appropriate  skin:  Warm with good turgor.  Without rash or lesion  extremities:  Examination of the extremities revealed no cyanosis, clubbing or edema.    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    Return to office post mammogram for further evaluation       Discussion/Summary: After explaining to the patient the high incidence of underlying malignancy with Paget's disease of the nipple she has agreed to mammography.  This will be scheduled and she will be seen back in the office following.    Patient's Body mass index is 32.81 kg/m². BMI is above normal parameters. Recommendations include: educational material.       Errors in dictation may reflect use of voice recognition software and not all errors in transcription may have been detected prior to signing.    Future Appointments   Date Time Provider Department Center   7/22/2019  2:10 PM Macey Abbasi MD [facility] None   8/5/2019  9:20 AM Abraham Martinez PA-C [facility] None   8/6/2019 10:10 AM Juwan Singh MD [facility] None

## 2019-07-23 ENCOUNTER — TELEPHONE (OUTPATIENT)
Dept: SURGERY | Facility: CLINIC | Age: 84
End: 2019-07-23

## 2019-07-23 DIAGNOSIS — N64.52 DISCHARGE FROM LEFT NIPPLE: Primary | ICD-10-CM

## 2019-07-23 NOTE — TELEPHONE ENCOUNTER
Informed Miss Beyer that her mammogram is 7/24/19 at 12:30. Explained where it is . She said okay.

## 2019-07-24 ENCOUNTER — TELEPHONE (OUTPATIENT)
Dept: SURGERY | Facility: CLINIC | Age: 84
End: 2019-07-24

## 2019-07-24 ENCOUNTER — HOSPITAL ENCOUNTER (OUTPATIENT)
Dept: ULTRASOUND IMAGING | Facility: HOSPITAL | Age: 84
Discharge: HOME | End: 2019-07-24

## 2019-07-24 ENCOUNTER — HOSPITAL ENCOUNTER (OUTPATIENT)
Dept: MAMMOGRAPHY | Facility: HOSPITAL | Age: 84
Discharge: HOME | End: 2019-07-24
Admitting: RADIOLOGY

## 2019-07-24 DIAGNOSIS — N64.52 DISCHARGE FROM LEFT NIPPLE: ICD-10-CM

## 2019-07-24 PROCEDURE — 76642 ULTRASOUND BREAST LIMITED: CPT

## 2019-07-24 PROCEDURE — 76642 ULTRASOUND BREAST LIMITED: CPT | Performed by: RADIOLOGY

## 2019-07-24 PROCEDURE — G0279 TOMOSYNTHESIS, MAMMO: HCPCS

## 2019-07-24 PROCEDURE — 77066 DX MAMMO INCL CAD BI: CPT

## 2019-07-24 PROCEDURE — G0279 TOMOSYNTHESIS, MAMMO: HCPCS | Performed by: RADIOLOGY

## 2019-07-24 PROCEDURE — 77066 DX MAMMO INCL CAD BI: CPT | Performed by: RADIOLOGY

## 2019-07-24 NOTE — TELEPHONE ENCOUNTER
Talked to Miss Beyer and she is coming in 8/8/19 at 11:30 and will stop her blood thinner 2 days before. JUAN CARLOS

## 2019-07-25 PROBLEM — N64.89 NIPPLE CRUSTING: Status: ACTIVE | Noted: 2019-07-25

## 2019-08-05 ENCOUNTER — OFFICE VISIT (OUTPATIENT)
Dept: CARDIOLOGY | Facility: CLINIC | Age: 84
End: 2019-08-05

## 2019-08-05 VITALS
WEIGHT: 187 LBS | HEART RATE: 58 BPM | RESPIRATION RATE: 16 BRPM | BODY MASS INDEX: 33.13 KG/M2 | DIASTOLIC BLOOD PRESSURE: 61 MMHG | HEIGHT: 63 IN | SYSTOLIC BLOOD PRESSURE: 112 MMHG

## 2019-08-05 DIAGNOSIS — Z79.899 ON AMIODARONE THERAPY: ICD-10-CM

## 2019-08-05 DIAGNOSIS — I10 ESSENTIAL HYPERTENSION: ICD-10-CM

## 2019-08-05 DIAGNOSIS — I48.0 PAROXYSMAL ATRIAL FIBRILLATION (HCC): Primary | ICD-10-CM

## 2019-08-05 PROCEDURE — 93000 ELECTROCARDIOGRAM COMPLETE: CPT | Performed by: PHYSICIAN ASSISTANT

## 2019-08-05 PROCEDURE — 99214 OFFICE O/P EST MOD 30 MIN: CPT | Performed by: PHYSICIAN ASSISTANT

## 2019-08-05 NOTE — PROGRESS NOTES
"Tono Yang MD  Kayleen Brasher  11/24/1931 08/05/2019    Patient Active Problem List   Diagnosis   • Atrial fibrillation with RVR (CMS/HCC)   • COPD with acute exacerbation (CMS/HCC)   • Other specified hypothyroidism   • Essential hypertension   • Lower extremity cellulitis   • Paroxysmal atrial fibrillation (CMS/HCC)   • Morbidly obese (CMS/HCC)   • Nipple crusting   • On amiodarone therapy       Dear Tono Yang MD:    Subjective     History of Present Illness:    Chief Complaint   Patient presents with   • Follow-up     4 mos   • Palpitations     races,skips on occas.   • Shortness of Breath     mod exertion   • Edema     \"slight\"   • Med Management     list provided       Kayleen Brasher is a pleasant 87 y.o. female with a past medical history significant for paroxysmal atrial fibrillation, COPD, hypothyroidism.  Patient comes in for routine cardiology follow-up.    Patient comes in for follow-up after being placed on amiodarone and last March due to atrial fibrillation with RVR.  She was seen in April where she was doing well by Dr. Zhang.  She also reports today that she has been doing well she denies any episodes where she feels like she has been in atrial fibrillation although she states that she does have some occasional palpitations and reports if she sits and rests they do improve.  She does admit that she does not do a lot of physical activity around the house but she can perform the activities that she wants to do.  He does deny any bleeding issues with Eliquis she also denies any chest pains or worsening shortness of breath or orthopnea.  She does report pedal edema but states that this is been chronic and unchanged.    Allergies   Allergen Reactions   • Sulfa Antibiotics    :      Current Outpatient Medications:   •  amiodarone (PACERONE) 200 MG tablet, Take 1 tablet by mouth Daily., Disp: 90 tablet, Rfl: 3  •  apixaban (ELIQUIS) 5 MG tablet tablet, Take 1 tablet by mouth 2 (Two) Times a " Day., Disp: 180 tablet, Rfl: 3  •  aspirin 81 MG EC tablet, Take 1 tablet by mouth Daily., Disp: 100 tablet, Rfl: 3  •  Cyanocobalamin ER 1000 MCG tablet controlled-release, Take 1 tablet by mouth Daily., Disp: 100 each, Rfl: 3  •  diltiaZEM (CARDIZEM) 30 MG tablet, Take 1 tablet by mouth 2 (Two) Times a Day., Disp: 180 tablet, Rfl: 3  •  levothyroxine (SYNTHROID, LEVOTHROID) 100 MCG tablet, Take 1 tablet by mouth Every Morning., Disp: 90 tablet, Rfl: 3  •  metoprolol tartrate (LOPRESSOR) 25 MG tablet, Take 1 tablet by mouth 2 (Two) Times a Day., Disp: 180 tablet, Rfl: 3  •  vitamin B-12 (CYANOCOBALAMIN) 1000 MCG tablet, Take 1,000 mcg by mouth Daily., Disp: , Rfl:   •  amiodarone (PACERONE) 200 MG tablet, Take 1 tablet by mouth Daily., Disp: 30 tablet, Rfl: 6  •  apixaban (ELIQUIS) 5 MG tablet tablet, Take 1 tablet by mouth Every 12 (Twelve) Hours., Disp: 60 tablet, Rfl: 4  •  aspirin 81 MG EC tablet, Take 1 tablet by mouth Daily., Disp: 30 tablet, Rfl: 4  •  diltiaZEM (CARDIZEM) 30 MG tablet, Take 1 tablet by mouth Every 12 (Twelve) Hours., Disp: 60 tablet, Rfl: 6  •  metoprolol tartrate (LOPRESSOR) 25 MG tablet, Take 1 tablet by mouth Every 12 (Twelve) Hours., Disp: 60 tablet, Rfl: 6    The following portions of the patient's history were reviewed and updated as appropriate: allergies, current medications, past family history, past medical history, past social history, past surgical history and problem list.    Social History     Tobacco Use   • Smoking status: Former Smoker   • Smokeless tobacco: Never Used   Substance Use Topics   • Alcohol use: No   • Drug use: No       Review of Systems   Constitution: Negative for weakness and malaise/fatigue.   Cardiovascular: Positive for dyspnea on exertion and palpitations. Negative for chest pain, irregular heartbeat and leg swelling.   Respiratory: Negative for cough and shortness of breath.    Hematologic/Lymphatic: Negative for bleeding problem. Does not bruise/bleed  "easily.   Gastrointestinal: Negative for nausea and vomiting.       Objective   Vitals:    08/05/19 0921   BP: 112/61   Pulse: 58   Resp: 16   Weight: 84.8 kg (187 lb)   Height: 160 cm (63\")     Body mass index is 33.13 kg/m².    Physical Exam   Constitutional: She is oriented to person, place, and time. She appears well-developed and well-nourished. No distress.   HENT:   Head: Normocephalic and atraumatic.   Cardiovascular: Regular rhythm and normal heart sounds. Bradycardia present.   Pulmonary/Chest: Effort normal and breath sounds normal. No respiratory distress.   Musculoskeletal: She exhibits edema ( 2+ pedal edema bilaterally).   Neurological: She is alert and oriented to person, place, and time.   Skin: She is not diaphoretic.       Lab Results   Component Value Date     03/15/2019    K 3.9 03/15/2019    CL 99 03/15/2019    CO2 28.3 03/15/2019    BUN 10 03/15/2019    CREATININE 0.59 03/15/2019    GLUCOSE 100 (H) 03/15/2019    CALCIUM 8.3 (L) 03/15/2019    AST 32 03/15/2019    ALT 11 03/15/2019    ALKPHOS 87 03/15/2019     Lab Results   Component Value Date    CKTOTAL 28 03/12/2019     Lab Results   Component Value Date    WBC 13.34 (H) 03/15/2019    HGB 12.8 03/15/2019    HCT 40.1 03/15/2019     03/15/2019     Lab Results   Component Value Date    INR 1.21 (H) 03/11/2019    INR 1.18 (H) 03/11/2019     Lab Results   Component Value Date    MG 1.8 03/15/2019     Lab Results   Component Value Date    TSH 0.530 03/11/2019      No results found for: BNP    During this visit the following were done:  Labs Reviewed [x]    Labs Ordered []    Radiology Reports Reviewed [x]    Radiology Ordered []    PCP Records Reviewed []    Referring Provider Records Reviewed []    ER Records Reviewed []    Hospital Records Reviewed []    History Obtained From Family []    Radiology Images Reviewed []    Other Reviewed []    Records Requested []      ECG 12 Lead  Date/Time: 8/5/2019 9:21 AM  Performed by: Juan" Abraham FITZPATRICK PA-C  Authorized by: Abraham Martinez PA-C   Comparison: compared with previous ECG   Similar to previous ECG  Rhythm: sinus bradycardia  Q waves: V1    Other findings: poor R wave progression    Clinical impression: non-specific ECG          Assessment/Plan    Diagnosis Plan   1. Paroxysmal atrial fibrillation (CMS/HCC)     2. Essential hypertension     3. On amiodarone therapy  Comprehensive Metabolic Panel    TSH    XR Chest 2 View    Full Pulmonary Function Test With Bronchodilator        Recommendations:  1. I will continue with Dr. Zhang's original plan I will perform routine amiodarone monitoring by ordering a CMP, TSH, chest x-ray, pulmonary function test, and recommended for her to have an eye exam within the next 1 to 2 months.  2. Otherwise she is doing well from cardiac standpoint and amiodarone is doing well for her keeping her rhythm under control.  She is also denying any bleeding issues.  3. Continue metoprolol tartrate, diltiazem, aspirin, Eliquis, and amiodarone.    No Follow-up on file.    As always, I appreciate very much the opportunity to participate in the cardiovascular care of your patients.      With Best Regards,    Abraham Martinez PA-C

## 2019-08-06 ENCOUNTER — OFFICE VISIT (OUTPATIENT)
Dept: UROLOGY | Facility: CLINIC | Age: 84
End: 2019-08-06

## 2019-08-06 VITALS
HEIGHT: 63 IN | WEIGHT: 189.2 LBS | DIASTOLIC BLOOD PRESSURE: 70 MMHG | BODY MASS INDEX: 33.52 KG/M2 | SYSTOLIC BLOOD PRESSURE: 140 MMHG

## 2019-08-06 DIAGNOSIS — R31.9 URINARY TRACT INFECTION WITH HEMATURIA, SITE UNSPECIFIED: ICD-10-CM

## 2019-08-06 DIAGNOSIS — N39.0 URINARY TRACT INFECTION WITH HEMATURIA, SITE UNSPECIFIED: ICD-10-CM

## 2019-08-06 DIAGNOSIS — R35.0 FREQUENCY OF URINATION: Primary | ICD-10-CM

## 2019-08-06 DIAGNOSIS — N32.81 DETRUSOR INSTABILITY: ICD-10-CM

## 2019-08-06 DIAGNOSIS — R33.9 INCOMPLETE BLADDER EMPTYING: ICD-10-CM

## 2019-08-06 LAB
BILIRUB BLD-MCNC: ABNORMAL MG/DL
CLARITY, POC: CLEAR
COLOR UR: YELLOW
GLUCOSE UR STRIP-MCNC: NEGATIVE MG/DL
KETONES UR QL: NEGATIVE
LEUKOCYTE EST, POC: ABNORMAL
NITRITE UR-MCNC: NEGATIVE MG/ML
PH UR: 5.5 [PH] (ref 5–8)
PROT UR STRIP-MCNC: NEGATIVE MG/DL
RBC # UR STRIP: NEGATIVE /UL
SP GR UR: 1.02 (ref 1–1.03)
UROBILINOGEN UR QL: ABNORMAL

## 2019-08-06 PROCEDURE — 99203 OFFICE O/P NEW LOW 30 MIN: CPT | Performed by: UROLOGY

## 2019-08-06 PROCEDURE — 51798 US URINE CAPACITY MEASURE: CPT | Performed by: UROLOGY

## 2019-08-06 PROCEDURE — 87086 URINE CULTURE/COLONY COUNT: CPT | Performed by: UROLOGY

## 2019-08-06 PROCEDURE — 81003 URINALYSIS AUTO W/O SCOPE: CPT | Performed by: UROLOGY

## 2019-08-06 RX ORDER — SULFAMETHOXAZOLE AND TRIMETHOPRIM 800; 160 MG/1; MG/1
1 TABLET ORAL 2 TIMES DAILY
Qty: 20 TABLET | Refills: 0 | Status: SHIPPED | OUTPATIENT
Start: 2019-08-06

## 2019-08-06 RX ORDER — NITROFURANTOIN 25; 75 MG/1; MG/1
100 CAPSULE ORAL DAILY
Qty: 20 CAPSULE | Refills: 3 | Status: SHIPPED | OUTPATIENT
Start: 2019-08-06

## 2019-08-06 RX ORDER — OXYBUTYNIN CHLORIDE 5 MG/1
5 TABLET ORAL DAILY
Qty: 30 TABLET | Refills: 3 | Status: SHIPPED | OUTPATIENT
Start: 2019-08-06 | End: 2019-11-03

## 2019-08-06 NOTE — PROGRESS NOTES
Chief Complaint:          Chief Complaint   Patient presents with   • Over active bladder       HPI:   87 y.o. female referred for overactive bladder.  She was hospitalized at the end of April.  She gets up every hour at night.  She was hospitalized for high blood pressure.  She had 4 days of no bowel movement she takes chronic milk of magnesia.  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 90 tablet 3   • apixaban (ELIQUIS) 5 MG tablet tablet Take 1 tablet by mouth 2 (Two) Times a Day. 180 tablet 3   • aspirin 81 MG EC tablet Take 1 tablet by mouth Daily. 100 tablet 3   • diltiaZEM (CARDIZEM) 30 MG tablet Take 1 tablet by mouth 2 (Two) Times a Day. 180 tablet 3   • levothyroxine (SYNTHROID, LEVOTHROID) 100 MCG tablet Take 1 tablet by mouth Every Morning. 90 tablet 3   • metoprolol tartrate (LOPRESSOR) 25 MG tablet Take 1 tablet by mouth 2 (Two) Times a Day. 180 tablet 3   • vitamin B-12 (CYANOCOBALAMIN) 1000 MCG tablet Take 1,000 mcg by mouth Daily.       No current facility-administered medications for this visit.         Allergies:      Allergies   Allergen Reactions   • Sulfa Antibiotics         Past Surgical History:     Past Surgical History:   Procedure Laterality Date   • BREAST BIOPSY Bilateral yrs ago    benign   • ELECTRICAL CARDIOVERSION  3/13/2019        • KNEE SURGERY           Social History:     Social History      Socioeconomic History   • Marital status:      Spouse name: Not on file   • Number of children: Not on file   • Years of education: Not on file   • Highest education level: Not on file   Tobacco Use   • Smoking status: Former Smoker   • Smokeless tobacco: Never Used   Substance and Sexual Activity   • Alcohol use: No   • Drug use: No       Family History:     Family History   Problem Relation Age of Onset   • Breast cancer Neg Hx        Review of Systems:     Review of Systems   Constitutional: Negative.  Negative for activity change, appetite change, chills, diaphoresis, fatigue and unexpected weight change.   HENT: Negative for congestion, dental problem, drooling, ear discharge, ear pain, facial swelling, hearing loss, mouth sores, nosebleeds, postnasal drip, rhinorrhea, sinus pressure, sneezing, sore throat, tinnitus, trouble swallowing and voice change.    Eyes: Negative.  Negative for photophobia, pain, discharge, redness, itching and visual disturbance.   Respiratory: Negative.  Negative for apnea, cough, choking, chest tightness, shortness of breath, wheezing and stridor.    Cardiovascular: Negative.  Negative for chest pain, palpitations and leg swelling.   Gastrointestinal: Negative.  Negative for abdominal distention, abdominal pain, anal bleeding, blood in stool, constipation, diarrhea, nausea, rectal pain and vomiting.   Endocrine: Negative.  Negative for cold intolerance, heat intolerance, polydipsia, polyphagia and polyuria.   Genitourinary: Positive for difficulty urinating, dysuria, frequency and urgency.   Musculoskeletal: Negative.  Negative for arthralgias, back pain, gait problem, joint swelling, myalgias, neck pain and neck stiffness.   Skin: Negative.  Negative for color change, pallor, rash and wound.   Allergic/Immunologic: Negative.  Negative for environmental allergies, food allergies and immunocompromised state.   Neurological: Negative.  Negative for dizziness, tremors,  seizures, syncope, facial asymmetry, speech difficulty, weakness, light-headedness, numbness and headaches.   Hematological: Negative.  Negative for adenopathy. Does not bruise/bleed easily.   Psychiatric/Behavioral: Negative for agitation, behavioral problems, confusion, decreased concentration, dysphoric mood, hallucinations, self-injury, sleep disturbance and suicidal ideas. The patient is not nervous/anxious and is not hyperactive.    All other systems reviewed and are negative.      Physical Exam:     Physical Exam   Constitutional: She appears well-developed and well-nourished.   HENT:   Head: Normocephalic and atraumatic.   Right Ear: External ear normal.   Left Ear: External ear normal.   Mouth/Throat: Oropharynx is clear and moist.   Eyes: Conjunctivae are normal. Pupils are equal, round, and reactive to light.   Cardiovascular: Normal rate, regular rhythm, normal heart sounds and intact distal pulses.   Pulmonary/Chest: Effort normal and breath sounds normal.   Abdominal: Soft. Bowel sounds are normal. She exhibits no distension and no mass. There is no tenderness. There is no rebound and no guarding.   Genitourinary: No vaginal discharge found.   Musculoskeletal: Normal range of motion.   Neurological: She is alert. She has normal reflexes.   Skin: Skin is warm and dry.   Psychiatric: She has a normal mood and affect. Her behavior is normal. Judgment and thought content normal.       I have reviewed the following portions of the patient's history: allergies, current medications, past family history, past medical history, past social history, past surgical history, problem list and ROS and confirm it's accurate.      Procedure:       Assessment/Plan:   Recurrent urinary tract infections-patient has been referred and diagnosed with recurrent urinary tract infections.  We discussed the types of organisms that are found in the urinary tract indicating that the vast majority are results of the patient's own  gastrointestinal aria.  We discussed how many of the antibiotics that are utilized can actually exacerbate these infections by creating resistant organisms and there is only a very few antibiotics that are concentrated in the urine and do not affect the rectal reservoir nor cause recurrent yeast vaginitis.  We discussed the risk factors for recurrent infections being intercourse in younger patients and atrophic changes in older patients.  We discussed the symptoms that are found including pain, pressure, burning, frequency, urgency suprapubic pain and painful intercourse.  I discussed upper tract symptoms including fevers, chills, and indicated the workup would be much more aggressive if the patient were to present with recurrent infections in the face of upper tract symptomatology such as fever.  I discussed the history of vesicoureteral reflux in young patients and finally chronic renal scarring as a result of such.  I recommend concomitant probiotics with treatment with antibiotics to protect the rectal reservoir including over-the-counter yogurt preparations to jourdan oral pills containing the appropriate probiotics.  I have a urine culture pending I started her empirically on Macrobid  Detrusor instability-patient has been diagnosed with detrusor instability which is in irritative bladder symptomatology most likely related to factors such as intake of bladder irritants, postinfectious irritation, prolapse, with a very large differential diagnosis.  The mainstay of treatment has been tight cholinergics which basically caused the bladder to have decreased contractility.  We have discussed the side effects of these treatments including dry mouth, double vision, and increasing constipation.  Started on Myrbetriq given samples      Patient reports that she is  currently experiencing any symptoms of urinary incontinence.      Patient's Body mass index is 33.52 kg/m². BMI is above normal parameters. Recommendations  include: educational material.              This document has been electronically signed by KALEB TOLBERT MD August 6, 2019 10:21 AM

## 2019-08-07 LAB — BACTERIA SPEC AEROBE CULT: NORMAL

## 2019-08-11 PROBLEM — N32.81 DETRUSOR INSTABILITY: Status: ACTIVE | Noted: 2019-08-11

## 2019-08-11 PROBLEM — R31.9 URINARY TRACT INFECTION WITH HEMATURIA: Status: ACTIVE | Noted: 2019-08-11

## 2019-08-11 PROBLEM — N39.0 URINARY TRACT INFECTION WITH HEMATURIA: Status: ACTIVE | Noted: 2019-08-11

## 2019-09-03 ENCOUNTER — HOSPITAL ENCOUNTER (OUTPATIENT)
Dept: RESPIRATORY THERAPY | Facility: HOSPITAL | Age: 84
Discharge: HOME | End: 2019-09-03
Admitting: PHYSICIAN ASSISTANT

## 2019-09-03 DIAGNOSIS — Z79.899 ON AMIODARONE THERAPY: ICD-10-CM

## 2019-09-03 PROCEDURE — 94060 EVALUATION OF WHEEZING: CPT | Performed by: INTERNAL MEDICINE

## 2019-09-03 PROCEDURE — 94729 DIFFUSING CAPACITY: CPT

## 2019-09-03 PROCEDURE — 94729 DIFFUSING CAPACITY: CPT | Performed by: INTERNAL MEDICINE

## 2019-09-03 PROCEDURE — 94727 GAS DIL/WSHOT DETER LNG VOL: CPT | Performed by: INTERNAL MEDICINE

## 2019-09-03 PROCEDURE — 94060 EVALUATION OF WHEEZING: CPT

## 2019-09-03 PROCEDURE — 94727 GAS DIL/WSHOT DETER LNG VOL: CPT

## 2019-09-03 PROCEDURE — 94640 AIRWAY INHALATION TREATMENT: CPT

## 2019-09-03 RX ORDER — ALBUTEROL SULFATE 2.5 MG/3ML
2.5 SOLUTION RESPIRATORY (INHALATION) ONCE
Status: COMPLETED | OUTPATIENT
Start: 2019-09-03 | End: 2019-09-03

## 2019-09-03 RX ADMIN — ALBUTEROL SULFATE 2.5 MG: 2.5 SOLUTION RESPIRATORY (INHALATION) at 14:47

## 2019-09-05 ENCOUNTER — OFFICE VISIT (OUTPATIENT)
Dept: SURGERY | Facility: CLINIC | Age: 84
End: 2019-09-05

## 2019-09-05 VITALS
DIASTOLIC BLOOD PRESSURE: 93 MMHG | SYSTOLIC BLOOD PRESSURE: 147 MMHG | HEART RATE: 63 BPM | BODY MASS INDEX: 33.52 KG/M2 | HEIGHT: 63 IN | WEIGHT: 189.2 LBS

## 2019-09-05 DIAGNOSIS — N64.89 NIPPLE CRUSTING: Primary | ICD-10-CM

## 2019-09-05 PROCEDURE — 11104 PUNCH BX SKIN SINGLE LESION: CPT | Performed by: SURGERY

## 2019-09-05 NOTE — PROGRESS NOTES
Subjective   Kayleen Brasher is a 87 y.o. female is here today for follow-up mammogram review.    History of Present Illness  Ms. Brasher was seen in the office today for breast follow up.  This is an 87-year-old female who was initially seen on 7/22/2019 with left nipple crusting that was felt to be suspicious for Paget's disease.  The patient underwent a bilateral diagnostic mammogram and left breast ultrasound on 7/24/2019.  This did not demonstrate any suspicious findings.  However as clinical suspicion was high office follow-up with nipple biopsy was recommended.  Patient was a no-show for 2 separate office visits but did come in today and was actually 3 hours ahead of schedule.  She did not remember to stop her blood thinner.  In fact she was not even aware that she was on a blood thinner.  She denies any changes in her breast examination or history since her visit of 7/22/2019  Allergies   Allergen Reactions   • Sulfa Antibiotics          Current Outpatient Medications   Medication Sig Dispense Refill   • amiodarone (PACERONE) 200 MG tablet Take 1 tablet by mouth Daily. 90 tablet 3   • aspirin 81 MG EC tablet Take 1 tablet by mouth Daily. 100 tablet 3   • diltiaZEM (CARDIZEM) 30 MG tablet Take 1 tablet by mouth 2 (Two) Times a Day. 180 tablet 3   • levothyroxine (SYNTHROID, LEVOTHROID) 100 MCG tablet Take 1 tablet by mouth Every Morning. 90 tablet 3   • levothyroxine (SYNTHROID, LEVOTHROID) 50 MCG tablet Take 1 tablet by mouth Daily. 30 tablet 3   • metoprolol tartrate (LOPRESSOR) 25 MG tablet Take 1 tablet by mouth 2 (Two) Times a Day. 180 tablet 3   • nitrofurantoin, macrocrystal-monohydrate, (MACROBID) 100 MG capsule Take 1 capsule by mouth Daily. 20 capsule 3   • oxybutynin (DITROPAN) 5 MG tablet Take 1 tablet by mouth Daily for 30 days. 30 tablet 3   • sulfamethoxazole-trimethoprim (BACTRIM DS) 800-160 MG per tablet Take 1 tablet by mouth 2 (Two) Times a Day. 20 tablet 0   • vitamin B-12 (CYANOCOBALAMIN)  "1000 MCG tablet Take 1,000 mcg by mouth Daily.     • apixaban (ELIQUIS) 5 MG tablet tablet Take 1 tablet by mouth 2 (Two) Times a Day. 180 tablet 3     No current facility-administered medications for this visit.      Past Medical History:   Diagnosis Date   • Arthritis    • COPD (chronic obstructive pulmonary disease) (CMS/HCC)    • Disease of thyroid gland    • Hypertension    • Hypertension    • Osteoporosis      Past Surgical History:   Procedure Laterality Date   • BREAST BIOPSY Bilateral yrs ago    benign   • ELECTRICAL CARDIOVERSION  3/13/2019        • KNEE SURGERY         The following portions of the patient's history were reviewed and updated as appropriate: allergies, current medications, past family history, past medical history, past social history, past surgical history and problem list.    Review of Systems  General: negative  Integumentary: negative  Eyes: negative  ENT: negative  Respiratory: negative  Gastrointestinal: negative  Cardiovascular: negative  Neurological: negative  Psychiatric: negative  Hematologic/Lymphatic: negative  Genitourinary: negative  Musculoskeletal: painful joints  Endocrine: negative  Breasts: nipple discharge    Objective   /93 (BP Location: Left arm)   Pulse 63   Ht 160 cm (63\")   Wt 85.8 kg (189 lb 3.2 oz)   BMI 33.52 kg/m²    Physical Exam  On examination this is a elderly female in no acute distress  HEENT examination: Within normal limits.  Conjunctiva pink.  Nose and ears appear normal.  Neck: Supple, full range of motion.  No JVD.  Musculoskeletal: Full range of motion all extremities without focal weakness. Normal gait. No digital cyanosis.  Psych: Patient is alert, oriented x3. Mood and affect are appropriate.  Left breast: No palpable mass.  Superficial ulceration of the nipple primarily the medial aspect.  No nipple discharge elicited.  No axillary adenopathy  Results/Data  Mammogram reports and images were reviewed and agree with the " assessment    Procedures   Procedure Note    Procedure: Nipple biopsy    Location: Left nipple    Anesthesia: 1% lidocaine with epinephrine    Description:  The risks and benefits of the procedure were discussed.  After prep of the skin with Betadine and infiltration with lidocaine a 3 mm punch was used to excise piece of the left nipple.  Site was closed with interrupted 4-0 Maxon sutures    Complications:  none    Follow-up: Pending pathology  Assessment/Plan   Left nipple lesion with findings suspicious for Paget's disease    Await biopsy results for further recommendations       Discussion/Summary    Patient's Body mass index is 33.52 kg/m². BMI is above normal parameters. Recommendations include: educational material.         Future Appointments   Date Time Provider Department Center   2/6/2020  9:15 AM Abraham Martinez PA-C [facility] None         Please note that portions of this note were completed with a voice recognition program.

## 2019-09-12 ENCOUNTER — OFFICE VISIT (OUTPATIENT)
Dept: SURGERY | Facility: CLINIC | Age: 84
End: 2019-09-12

## 2019-09-12 VITALS
WEIGHT: 189.2 LBS | HEIGHT: 63 IN | BODY MASS INDEX: 33.52 KG/M2 | HEART RATE: 70 BPM | SYSTOLIC BLOOD PRESSURE: 130 MMHG | DIASTOLIC BLOOD PRESSURE: 79 MMHG

## 2019-09-12 DIAGNOSIS — C50.912: Primary | ICD-10-CM

## 2019-09-12 PROCEDURE — 99213 OFFICE O/P EST LOW 20 MIN: CPT | Performed by: SURGERY

## 2019-09-12 RX ORDER — CEFAZOLIN SODIUM 2 G/50ML
2 SOLUTION INTRAVENOUS ONCE
Status: CANCELLED | OUTPATIENT
Start: 2019-10-01 | End: 2019-09-12

## 2019-09-12 NOTE — PROGRESS NOTES
Subjective   Kayleen Brasher is a 87 y.o. female here today for pathology report.    History of Present Illness  Ms. Brasher was seen in the office today for breast follow up.  This is an 87-year-old female who was initially seen on 7/22/2019 with left nipple crusting that was felt to be suspicious for Paget's disease.  The patient underwent a bilateral diagnostic mammogram and left breast ultrasound on 7/24/2019.  This did not demonstrate any suspicious findings.  However as clinical suspicion was high office follow-up with nipple biopsy was recommended.  Patient was a no-show for 2 separate office visits but did come on 9/5/19 for nipple biopsy which demonstrated Paget's disease.  He is accompanied today by her .  Allergies   Allergen Reactions   • Sulfa Antibiotics          Current Outpatient Medications   Medication Sig Dispense Refill   • amiodarone (PACERONE) 200 MG tablet Take 1 tablet by mouth Daily. 90 tablet 3   • apixaban (ELIQUIS) 5 MG tablet tablet Take 1 tablet by mouth 2 (Two) Times a Day. 180 tablet 3   • aspirin 81 MG EC tablet Take 1 tablet by mouth Daily. 100 tablet 3   • diltiaZEM (CARDIZEM) 30 MG tablet Take 1 tablet by mouth 2 (Two) Times a Day. 180 tablet 3   • levothyroxine (SYNTHROID, LEVOTHROID) 100 MCG tablet Take 1 tablet by mouth Every Morning. 90 tablet 3   • levothyroxine (SYNTHROID, LEVOTHROID) 50 MCG tablet Take 1 tablet by mouth Daily. 30 tablet 3   • metoprolol tartrate (LOPRESSOR) 25 MG tablet Take 1 tablet by mouth 2 (Two) Times a Day. 180 tablet 3   • nitrofurantoin, macrocrystal-monohydrate, (MACROBID) 100 MG capsule Take 1 capsule by mouth Daily. 20 capsule 3   • oxybutynin (DITROPAN) 5 MG tablet Take 1 tablet by mouth Daily for 30 days. 30 tablet 3   • sulfamethoxazole-trimethoprim (BACTRIM DS) 800-160 MG per tablet Take 1 tablet by mouth 2 (Two) Times a Day. 20 tablet 0   • vitamin B-12 (CYANOCOBALAMIN) 1000 MCG tablet Take 1,000 mcg by mouth Daily.       No current  "facility-administered medications for this visit.        Objective   /79 (BP Location: Left arm)   Pulse 70   Ht 160 cm (63\")   Wt 85.8 kg (189 lb 3.2 oz)   BMI 33.52 kg/m²    Physical Exam  General:  This is a WD WN elderly female in no acute distress  Vital signs stable, afebrile  HEENT exam:  WNL. Sclera are anicteric.  EOMI  Neck:  supple, FROM.  No JVD.  Trachea midline.  No palpable thyroid nodules. No supraclavicular adenopathy  Lungs:  Respiratory effort normal. Auscultation: Clear, without wheezes, rhonchi, rales  Heart:  Regular rate and rhythm, without murmur, gallop, rub.  No pedal edema  Breasts: On visual inspection there is crusting and asymmetric erythema over most of the left nipple.  Examination of the right breast demonstrates no discrete mass, skin change, or axillary adenopathy.  With the exception of the skin changes of the left nipple no underlying masses identified in the left breast.  There is no axillary adenopathy..  Abdomen: Nontender, without hepatosplenomegaly  Musculoskeletal:  muscle strength/tone is normal.    Psyc:  alert, oriented x 3.  Mood and affect are appropriate  skin:  Warm with good turgor.  Without rash or lesion  extremities:  Examination of the extremities revealed no cyanosis, clubbing or edema  Results/Data  Pathology result was discussed with the patient and  and information about Paget's disease was given    Procedures     Assessment/Plan   Paget's disease of the left nipple    Plan: Recommend excision of the left nipple under MAC anesthesia       Discussion/Summary: The risks of the surgical procedure were discussed.  Options of alternative treatments including no treatment (if applicable) were discussed.  Patient voiced understanding of the above issues and wishes to proceed  Of note is that the case was discussed at tumor board and there was agreement that excision of the nipple alone would be adequate treatment    Future Appointments   Date Time " Provider Department Center   9/12/2019  9:50 AM Macey Abbasi MD [facility] None   2/6/2020  9:15 AM Abraham Martinez, PA-C [facility] None         Please note that portions of this note were completed with a voice recognition program.

## 2019-09-15 NOTE — H&P
Subjective   Kayleen Brasher is a 87 y.o. female here today for pathology report.    History of Present Illness  Ms. Brasher was seen in the office today for breast follow up.  This is an 87-year-old female who was initially seen on 7/22/2019 with left nipple crusting that was felt to be suspicious for Paget's disease.  The patient underwent a bilateral diagnostic mammogram and left breast ultrasound on 7/24/2019.  This did not demonstrate any suspicious findings.  However as clinical suspicion was high office follow-up with nipple biopsy was recommended.  Patient was a no-show for 2 separate office visits but did come on 9/5/19 for nipple biopsy which demonstrated Paget's disease.  He is accompanied today by her .  Allergies   Allergen Reactions   • Sulfa Antibiotics          Current Outpatient Medications   Medication Sig Dispense Refill   • amiodarone (PACERONE) 200 MG tablet Take 1 tablet by mouth Daily. 90 tablet 3   • apixaban (ELIQUIS) 5 MG tablet tablet Take 1 tablet by mouth 2 (Two) Times a Day. 180 tablet 3   • aspirin 81 MG EC tablet Take 1 tablet by mouth Daily. 100 tablet 3   • diltiaZEM (CARDIZEM) 30 MG tablet Take 1 tablet by mouth 2 (Two) Times a Day. 180 tablet 3   • levothyroxine (SYNTHROID, LEVOTHROID) 100 MCG tablet Take 1 tablet by mouth Every Morning. 90 tablet 3   • levothyroxine (SYNTHROID, LEVOTHROID) 50 MCG tablet Take 1 tablet by mouth Daily. 30 tablet 3   • metoprolol tartrate (LOPRESSOR) 25 MG tablet Take 1 tablet by mouth 2 (Two) Times a Day. 180 tablet 3   • nitrofurantoin, macrocrystal-monohydrate, (MACROBID) 100 MG capsule Take 1 capsule by mouth Daily. 20 capsule 3   • oxybutynin (DITROPAN) 5 MG tablet Take 1 tablet by mouth Daily for 30 days. 30 tablet 3   • sulfamethoxazole-trimethoprim (BACTRIM DS) 800-160 MG per tablet Take 1 tablet by mouth 2 (Two) Times a Day. 20 tablet 0   • vitamin B-12 (CYANOCOBALAMIN) 1000 MCG tablet Take 1,000 mcg by mouth Daily.       No current  "facility-administered medications for this visit.        Objective   /79 (BP Location: Left arm)   Pulse 70   Ht 160 cm (63\")   Wt 85.8 kg (189 lb 3.2 oz)   BMI 33.52 kg/m²     Physical Exam  General:  This is a WD WN elderly female in no acute distress  Vital signs stable, afebrile  HEENT exam:  WNL. Sclera are anicteric.  EOMI  Neck:  supple, FROM.  No JVD.  Trachea midline.  No palpable thyroid nodules. No supraclavicular adenopathy  Lungs:  Respiratory effort normal. Auscultation: Clear, without wheezes, rhonchi, rales  Heart:  Regular rate and rhythm, without murmur, gallop, rub.  No pedal edema  Breasts: On visual inspection there is crusting and asymmetric erythema over most of the left nipple.  Examination of the right breast demonstrates no discrete mass, skin change, or axillary adenopathy.  With the exception of the skin changes of the left nipple no underlying masses identified in the left breast.  There is no axillary adenopathy..  Abdomen: Nontender, without hepatosplenomegaly  Musculoskeletal:  muscle strength/tone is normal.    Psyc:  alert, oriented x 3.  Mood and affect are appropriate  skin:  Warm with good turgor.  Without rash or lesion  extremities:  Examination of the extremities revealed no cyanosis, clubbing or edema  Results/Data  Pathology result was discussed with the patient and  and information about Paget's disease was given    Procedures     Assessment/Plan   Paget's disease of the left nipple    Plan: Recommend excision of the left nipple under MAC anesthesia       Discussion/Summary: The risks of the surgical procedure were discussed.  Options of alternative treatments including no treatment (if applicable) were discussed.  Patient voiced understanding of the above issues and wishes to proceed  Of note is that the case was discussed at tumor board and there was agreement that excision of the nipple alone would be adequate treatment    Future Appointments   Date Time " Provider Department Center   9/12/2019  9:50 AM Macey Al MD [facility] None   2/6/2020  9:15 AM Abraham Martinez, RAJNI [facility] None         Please note that portions of this note were completed with a voice recognition program.  This document has been electronically signed by Macey AL MD on September 15, 2019 1:03 PM

## 2019-09-23 DIAGNOSIS — R94.2 ABNORMAL PFT: Primary | ICD-10-CM

## 2019-09-24 ENCOUNTER — TELEPHONE (OUTPATIENT)
Dept: SURGERY | Facility: CLINIC | Age: 84
End: 2019-09-24

## 2019-09-24 NOTE — TELEPHONE ENCOUNTER
Informed Miss Beyer about PAT and talked about blood thinner. She said she had it all written down. She also said she had labs drawn in the last 2 weeks so I called ask for a copy to be sent. JUAN CARLOS

## 2019-09-25 ENCOUNTER — APPOINTMENT (OUTPATIENT)
Dept: PREADMISSION TESTING | Facility: HOSPITAL | Age: 84
End: 2019-09-25

## 2019-09-25 VITALS — HEIGHT: 63 IN | BODY MASS INDEX: 32.78 KG/M2 | WEIGHT: 185 LBS

## 2019-09-30 ENCOUNTER — TELEPHONE (OUTPATIENT)
Dept: SURGERY | Facility: CLINIC | Age: 84
End: 2019-09-30

## 2019-10-01 ENCOUNTER — ANESTHESIA EVENT (OUTPATIENT)
Dept: PERIOP | Facility: HOSPITAL | Age: 84
End: 2019-10-01

## 2019-10-01 ENCOUNTER — ANESTHESIA (OUTPATIENT)
Dept: PERIOP | Facility: HOSPITAL | Age: 84
End: 2019-10-01

## 2019-10-01 ENCOUNTER — HOSPITAL ENCOUNTER (OUTPATIENT)
Facility: HOSPITAL | Age: 84
Setting detail: HOSPITAL OUTPATIENT SURGERY
Discharge: HOME | End: 2019-10-01
Attending: SURGERY | Admitting: SURGERY

## 2019-10-01 VITALS
OXYGEN SATURATION: 95 % | SYSTOLIC BLOOD PRESSURE: 154 MMHG | HEART RATE: 65 BPM | HEIGHT: 63 IN | DIASTOLIC BLOOD PRESSURE: 80 MMHG | WEIGHT: 185.38 LBS | TEMPERATURE: 97.8 F | RESPIRATION RATE: 18 BRPM | BODY MASS INDEX: 32.85 KG/M2

## 2019-10-01 DIAGNOSIS — C50.912: ICD-10-CM

## 2019-10-01 PROCEDURE — 88360 TUMOR IMMUNOHISTOCHEM/MANUAL: CPT | Performed by: SURGERY

## 2019-10-01 PROCEDURE — 25010000003 CEFAZOLIN SODIUM-DEXTROSE 2-3 GM-%(50ML) RECONSTITUTED SOLUTION: Performed by: SURGERY

## 2019-10-01 PROCEDURE — 88305 TISSUE EXAM BY PATHOLOGIST: CPT | Performed by: SURGERY

## 2019-10-01 PROCEDURE — 25010000002 ONDANSETRON PER 1 MG: Performed by: NURSE ANESTHETIST, CERTIFIED REGISTERED

## 2019-10-01 PROCEDURE — 88342 IMHCHEM/IMCYTCHM 1ST ANTB: CPT | Performed by: SURGERY

## 2019-10-01 PROCEDURE — 19120 REMOVAL OF BREAST LESION: CPT | Performed by: SURGERY

## 2019-10-01 PROCEDURE — 25010000002 FENTANYL CITRATE (PF) 100 MCG/2ML SOLUTION: Performed by: NURSE ANESTHETIST, CERTIFIED REGISTERED

## 2019-10-01 PROCEDURE — 25010000002 PROPOFOL 10 MG/ML EMULSION: Performed by: NURSE ANESTHETIST, CERTIFIED REGISTERED

## 2019-10-01 RX ORDER — MAGNESIUM HYDROXIDE 1200 MG/15ML
LIQUID ORAL AS NEEDED
Status: DISCONTINUED | OUTPATIENT
Start: 2019-10-01 | End: 2019-10-01 | Stop reason: HOSPADM

## 2019-10-01 RX ORDER — PROPOFOL 10 MG/ML
VIAL (ML) INTRAVENOUS AS NEEDED
Status: DISCONTINUED | OUTPATIENT
Start: 2019-10-01 | End: 2019-10-01 | Stop reason: SURG

## 2019-10-01 RX ORDER — IPRATROPIUM BROMIDE AND ALBUTEROL SULFATE 2.5; .5 MG/3ML; MG/3ML
3 SOLUTION RESPIRATORY (INHALATION) ONCE AS NEEDED
Status: DISCONTINUED | OUTPATIENT
Start: 2019-10-01 | End: 2019-10-01 | Stop reason: HOSPADM

## 2019-10-01 RX ORDER — ONDANSETRON 2 MG/ML
4 INJECTION INTRAMUSCULAR; INTRAVENOUS AS NEEDED
Status: DISCONTINUED | OUTPATIENT
Start: 2019-10-01 | End: 2019-10-01 | Stop reason: HOSPADM

## 2019-10-01 RX ORDER — SODIUM CHLORIDE 0.9 % (FLUSH) 0.9 %
3 SYRINGE (ML) INJECTION EVERY 12 HOURS SCHEDULED
Status: DISCONTINUED | OUTPATIENT
Start: 2019-10-01 | End: 2019-10-01 | Stop reason: HOSPADM

## 2019-10-01 RX ORDER — BUPIVACAINE HYDROCHLORIDE 5 MG/ML
INJECTION, SOLUTION PERINEURAL AS NEEDED
Status: DISCONTINUED | OUTPATIENT
Start: 2019-10-01 | End: 2019-10-01 | Stop reason: HOSPADM

## 2019-10-01 RX ORDER — TRAMADOL HYDROCHLORIDE 50 MG/1
50 TABLET ORAL EVERY 8 HOURS PRN
Qty: 15 TABLET | Refills: 0 | Status: SHIPPED | OUTPATIENT
Start: 2019-10-01 | End: 2019-10-16

## 2019-10-01 RX ORDER — ONDANSETRON 2 MG/ML
INJECTION INTRAMUSCULAR; INTRAVENOUS AS NEEDED
Status: DISCONTINUED | OUTPATIENT
Start: 2019-10-01 | End: 2019-10-01 | Stop reason: SURG

## 2019-10-01 RX ORDER — MEPERIDINE HYDROCHLORIDE 25 MG/ML
12.5 INJECTION INTRAMUSCULAR; INTRAVENOUS; SUBCUTANEOUS
Status: DISCONTINUED | OUTPATIENT
Start: 2019-10-01 | End: 2019-10-01 | Stop reason: HOSPADM

## 2019-10-01 RX ORDER — CEFAZOLIN SODIUM 2 G/50ML
2 SOLUTION INTRAVENOUS ONCE
Status: COMPLETED | OUTPATIENT
Start: 2019-10-01 | End: 2019-10-01

## 2019-10-01 RX ORDER — FENTANYL CITRATE 50 UG/ML
50 INJECTION, SOLUTION INTRAMUSCULAR; INTRAVENOUS
Status: DISCONTINUED | OUTPATIENT
Start: 2019-10-01 | End: 2019-10-01 | Stop reason: HOSPADM

## 2019-10-01 RX ORDER — OXYCODONE HYDROCHLORIDE AND ACETAMINOPHEN 5; 325 MG/1; MG/1
1 TABLET ORAL ONCE AS NEEDED
Status: DISCONTINUED | OUTPATIENT
Start: 2019-10-01 | End: 2019-10-01 | Stop reason: HOSPADM

## 2019-10-01 RX ORDER — LIDOCAINE HYDROCHLORIDE 20 MG/ML
INJECTION, SOLUTION INFILTRATION; PERINEURAL AS NEEDED
Status: DISCONTINUED | OUTPATIENT
Start: 2019-10-01 | End: 2019-10-01 | Stop reason: SURG

## 2019-10-01 RX ORDER — FENTANYL CITRATE 50 UG/ML
INJECTION, SOLUTION INTRAMUSCULAR; INTRAVENOUS AS NEEDED
Status: DISCONTINUED | OUTPATIENT
Start: 2019-10-01 | End: 2019-10-01 | Stop reason: SURG

## 2019-10-01 RX ORDER — FAMOTIDINE 10 MG/ML
INJECTION, SOLUTION INTRAVENOUS AS NEEDED
Status: DISCONTINUED | OUTPATIENT
Start: 2019-10-01 | End: 2019-10-01 | Stop reason: SURG

## 2019-10-01 RX ORDER — SODIUM CHLORIDE 0.9 % (FLUSH) 0.9 %
3-10 SYRINGE (ML) INJECTION AS NEEDED
Status: DISCONTINUED | OUTPATIENT
Start: 2019-10-01 | End: 2019-10-01 | Stop reason: HOSPADM

## 2019-10-01 RX ORDER — SODIUM CHLORIDE, SODIUM LACTATE, POTASSIUM CHLORIDE, CALCIUM CHLORIDE 600; 310; 30; 20 MG/100ML; MG/100ML; MG/100ML; MG/100ML
125 INJECTION, SOLUTION INTRAVENOUS CONTINUOUS
Status: DISCONTINUED | OUTPATIENT
Start: 2019-10-01 | End: 2019-10-01 | Stop reason: HOSPADM

## 2019-10-01 RX ADMIN — PROPOFOL 120 MG: 10 INJECTION, EMULSION INTRAVENOUS at 10:29

## 2019-10-01 RX ADMIN — FAMOTIDINE 20 MG: 10 INJECTION INTRAVENOUS at 10:27

## 2019-10-01 RX ADMIN — ONDANSETRON 4 MG: 2 INJECTION INTRAMUSCULAR; INTRAVENOUS at 10:41

## 2019-10-01 RX ADMIN — FENTANYL CITRATE 25 MCG: 50 INJECTION INTRAMUSCULAR; INTRAVENOUS at 10:42

## 2019-10-01 RX ADMIN — LIDOCAINE HYDROCHLORIDE 40 MG: 20 INJECTION, SOLUTION INFILTRATION; PERINEURAL at 10:29

## 2019-10-01 RX ADMIN — FENTANYL CITRATE 25 MCG: 50 INJECTION INTRAMUSCULAR; INTRAVENOUS at 10:52

## 2019-10-01 RX ADMIN — FENTANYL CITRATE 50 MCG: 50 INJECTION INTRAMUSCULAR; INTRAVENOUS at 10:29

## 2019-10-01 RX ADMIN — SODIUM CHLORIDE, POTASSIUM CHLORIDE, SODIUM LACTATE AND CALCIUM CHLORIDE 125 ML/HR: 600; 310; 30; 20 INJECTION, SOLUTION INTRAVENOUS at 10:16

## 2019-10-01 RX ADMIN — CEFAZOLIN SODIUM 2 G: 2 SOLUTION INTRAVENOUS at 10:28

## 2019-10-01 NOTE — INTERVAL H&P NOTE
H&P reviewed. The patient was examined and there are no changes to the H&P.         Loop recorder inserted.

## 2019-10-01 NOTE — ANESTHESIA PREPROCEDURE EVALUATION
Anesthesia Evaluation     Patient summary reviewed and Nursing notes reviewed   no history of anesthetic complications:  NPO Solid Status: > 8 hours  NPO Liquid Status: > 8 hours           Airway   Mallampati: II  TM distance: >3 FB  Neck ROM: limited  No difficulty expected  Dental    (+) poor dentition and upper dentures    Pulmonary - normal exam   (+) a smoker Former, COPD,   (-) asthma  Cardiovascular   Exercise tolerance: good (4-7 METS)    NYHA Classification: II  PT is on anticoagulation therapy  Patient on routine beta blocker and Beta blocker given within 24 hours of surgery  Rhythm: irregular  Rate: abnormal    (+) hypertension, dysrhythmias Atrial Fib, CHF,   (-) past MI, angina      Neuro/Psych  (+) CVA (April 2019) residual symptoms,     (-) seizures  GI/Hepatic/Renal/Endo    (+) obesity,   hypothyroidism,   (-) morbid obesity, GERD, liver disease, no renal disease, diabetes    Musculoskeletal     Abdominal  - normal exam   Substance History - negative use     OB/GYN negative ob/gyn ROS         Other   (+) arthritis         Phys Exam Other: Partial Lower Plate                  Anesthesia Plan    ASA 3     general     intravenous induction   Anesthetic plan, all risks, benefits, and alternatives have been provided, discussed and informed consent has been obtained with: patient.  Use of blood products discussed with patient  Consented to blood products.

## 2019-10-01 NOTE — ANESTHESIA PROCEDURE NOTES
Airway  Urgency: elective    Date/Time: 10/1/2019 10:31 AM    General Information and Staff    Patient location during procedure: OR    Indications and Patient Condition    Preoxygenated: yes  Mask difficulty assessment: 0 - not attempted    Final Airway Details  Final airway type: supraglottic airway      Successful airway: unique  Size 4    Number of attempts at approach: 1  Assessment: lips, teeth, and gum same as pre-op and atraumatic intubation

## 2019-10-01 NOTE — ANESTHESIA POSTPROCEDURE EVALUATION
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, left (CMS/HCC) [C50.912])    Surgeon:  Macey Abbasi MD Provider:  Oneil Hutchins MD    Anesthesia Type:  general ASA Status:  3          Anesthesia Type: general  Last vitals  BP   154/80 (10/01/19 1208)   Temp   97.8 °F (36.6 °C) (10/01/19 1208)   Pulse   65 (10/01/19 1208)   Resp   18 (10/01/19 1208)     SpO2   95 % (10/01/19 1208)     Post Anesthesia Care and Evaluation    Patient location during evaluation: PHASE II  Patient participation: complete - patient participated  Level of consciousness: awake and alert  Pain score: 1  Pain management: adequate  Airway patency: patent  Anesthetic complications: No anesthetic complications  PONV Status: controlled  Cardiovascular status: acceptable  Respiratory status: acceptable  Hydration status: acceptable

## 2019-10-01 NOTE — OP NOTE
Left nipple excision    Surgeon:  Macey Abbasi M.D., JHON    Assistant; Ruben    Pre-op: Paget's disease of the left nipple    Post-op:  Same    Anesthesia:  general    Indications: Paget's disease of the left nipple with no evidence of disease within the breast parenchyma       Procedure Details   After obtaining informed consent and with venous compression boots in place and receiving preoperative antibiotics the patient was taken to the operating room and placed under anesthesia. The left breast was prepped and draped in a sterile fashion. An incision was made oriented in the transverse dimension around the nipple and carried down into the breast tissue.  Dissection was carried out around the nipple to a depth of 3 to 4 cm.  Marking stitch was placed at 12:00.    The wound was irrigated and hemostasis was obtained.  The incision was closed in a several layer closure of deep 0 Vicryl sutures, followed by 3-0 Vicryl subdermal sutures and 4-0 Vicryl subcuticular suture.    Findings:  Paget's disease of the left nipple    Estimated Blood Loss:  Minimal    Blood administered:            Drains: none           Specimens:   ID Type Source Tests Collected by Time   A : nipple excision- stitch at 12 o clock Tissue Breast, Left TISSUE PATHOLOGY EXAM Macey Abbasi MD 10/1/2019 1043        Grafts and Implants: None        Complications:  none           Disposition: PACU - hemodynamically stable.           Condition: stable

## 2019-10-09 LAB
LAB AP CASE REPORT: NORMAL
PATH REPORT.FINAL DX SPEC: NORMAL

## 2019-10-10 ENCOUNTER — OFFICE VISIT (OUTPATIENT)
Dept: SURGERY | Facility: CLINIC | Age: 84
End: 2019-10-10

## 2019-10-10 VITALS
BODY MASS INDEX: 32.71 KG/M2 | HEART RATE: 64 BPM | DIASTOLIC BLOOD PRESSURE: 86 MMHG | HEIGHT: 63 IN | WEIGHT: 184.6 LBS | SYSTOLIC BLOOD PRESSURE: 143 MMHG

## 2019-10-10 DIAGNOSIS — Z09 POSTOP CHECK: ICD-10-CM

## 2019-10-10 DIAGNOSIS — C50.912: Primary | ICD-10-CM

## 2019-10-10 PROCEDURE — 99024 POSTOP FOLLOW-UP VISIT: CPT | Performed by: SURGERY

## 2019-10-10 RX ORDER — TAMOXIFEN CITRATE 20 MG/1
20 TABLET ORAL DAILY
Qty: 30 TABLET | Refills: 6 | Status: SHIPPED | OUTPATIENT
Start: 2019-10-10 | End: 2019-11-09

## 2019-10-10 NOTE — PROGRESS NOTES
Subjective   Kayleen Brasher is a 87 y.o. female here today for post op.    History of Present Illness  Ms. Brasher was seen in the office today for her first post op visit following excision of the left nipple for Paget's disease.  Final pathology did demonstrate close but clear margins with DCIS.  The patient did not have any evidence of underlying cancer noted on imaging.  She was presented at tumor board and the recommendation was for postoperative adjuvant hormonal therapy.  Given the patient's age of 87 it was felt that radiation could be avoided initially.  Allergies   Allergen Reactions   • Sulfa Antibiotics Hives         Current Outpatient Medications   Medication Sig Dispense Refill   • amiodarone (PACERONE) 200 MG tablet Take 1 tablet by mouth Daily. 90 tablet 3   • apixaban (ELIQUIS) 5 MG tablet tablet Take 1 tablet by mouth 2 (Two) Times a Day. 180 tablet 3   • aspirin 81 MG EC tablet Take 1 tablet by mouth Daily. 100 tablet 3   • diltiaZEM (CARDIZEM) 30 MG tablet Take 1 tablet by mouth 2 (Two) Times a Day. 180 tablet 3   • levothyroxine (SYNTHROID, LEVOTHROID) 100 MCG tablet Take 1 tablet by mouth Every Morning. (Patient taking differently: Take 50 mcg by mouth Every Morning.) 90 tablet 3   • levothyroxine (SYNTHROID, LEVOTHROID) 50 MCG tablet Take 1 tablet by mouth Daily. 30 tablet 3   • metoprolol tartrate (LOPRESSOR) 25 MG tablet Take 1 tablet by mouth 2 (Two) Times a Day. 180 tablet 3   • nitrofurantoin, macrocrystal-monohydrate, (MACROBID) 100 MG capsule Take 1 capsule by mouth Daily. 20 capsule 3   • oxybutynin (DITROPAN) 5 MG tablet Take 1 tablet by mouth Daily for 30 days. 30 tablet 3   • sulfamethoxazole-trimethoprim (BACTRIM DS) 800-160 MG per tablet Take 1 tablet by mouth 2 (Two) Times a Day. 20 tablet 0   • traMADol (ULTRAM) 50 MG tablet Take 1 tablet by mouth Every 8 (Eight) Hours As Needed for Moderate Pain. 15 tablet 0   • vitamin B-12 (CYANOCOBALAMIN) 1000 MCG tablet Take 1,000 mcg by  "mouth Daily.       No current facility-administered medications for this visit.        Objective   Ht 160 cm (63\")   Wt 83.7 kg (184 lb 9.6 oz)   BMI 32.70 kg/m²    Physical Exam  On examination this is a elderly female in no acute distress  HEENT examination: Within normal limits.  Conjunctiva pink.  Nose and ears appear normal.  Neck: Supple, full range of motion.  No JVD.  Musculoskeletal: Full range of motion all extremities without focal weakness. Normal gait. No digital cyanosis.  Psych: Patient is alert, oriented x3. Mood and affect are appropriate.  Left breast: Healing scar at level of the nipple areolar complex with mild bruising  Results/Data  Pathology report was reviewed and discussed with the patient.    Procedures     Assessment/Plan   Paget's disease of the left nipple, ER positive, [facility] negative    Plan:  Start tamoxifen  New baseline mammogram 3 months with return to the office following         Discussion/Summary    Future Appointments   Date Time Provider Department Center   2/6/2020  9:15 AM Abraham Martinez PA-C [facility] None         Please note that portions of this note were completed with a voice recognition program.  "

## 2019-12-23 ENCOUNTER — APPOINTMENT (OUTPATIENT)
Dept: ULTRASOUND IMAGING | Facility: HOSPITAL | Age: 84
End: 2019-12-23

## 2019-12-23 ENCOUNTER — HOSPITAL ENCOUNTER (EMERGENCY)
Facility: HOSPITAL | Age: 84
Discharge: HOME | End: 2019-12-23
Attending: EMERGENCY MEDICINE | Admitting: EMERGENCY MEDICINE

## 2019-12-23 VITALS
SYSTOLIC BLOOD PRESSURE: 145 MMHG | BODY MASS INDEX: 33.13 KG/M2 | RESPIRATION RATE: 18 BRPM | WEIGHT: 187 LBS | OXYGEN SATURATION: 96 % | TEMPERATURE: 97.6 F | DIASTOLIC BLOOD PRESSURE: 73 MMHG | HEIGHT: 63 IN | HEART RATE: 62 BPM

## 2019-12-23 DIAGNOSIS — I73.9 PERIPHERAL ARTERIAL DISEASE (HCC): Primary | ICD-10-CM

## 2019-12-23 DIAGNOSIS — R60.0 BILATERAL LOWER EXTREMITY EDEMA: ICD-10-CM

## 2019-12-23 LAB
ALBUMIN SERPL-MCNC: 2.43 G/DL (ref 3.5–5.2)
ALBUMIN/GLOB SERPL: 0.6 G/DL
ALP SERPL-CCNC: 79 U/L (ref 39–117)
ALT SERPL W P-5'-P-CCNC: 14 U/L (ref 1–33)
ANION GAP SERPL CALCULATED.3IONS-SCNC: 9.4 MMOL/L (ref 5–15)
AST SERPL-CCNC: 30 U/L (ref 1–32)
BASOPHILS # BLD AUTO: 0.06 10*3/MM3 (ref 0–0.2)
BASOPHILS NFR BLD AUTO: 0.8 % (ref 0–1.5)
BILIRUB SERPL-MCNC: 0.8 MG/DL (ref 0.2–1.2)
BUN BLD-MCNC: 10 MG/DL (ref 8–23)
BUN/CREAT SERPL: 11.8 (ref 7–25)
CALCIUM SPEC-SCNC: 8.2 MG/DL (ref 8.6–10.5)
CHLORIDE SERPL-SCNC: 100 MMOL/L (ref 98–107)
CO2 SERPL-SCNC: 30.6 MMOL/L (ref 22–29)
CREAT BLD-MCNC: 0.85 MG/DL (ref 0.57–1)
CRP SERPL-MCNC: 0.44 MG/DL (ref 0–0.5)
DEPRECATED RDW RBC AUTO: 54.9 FL (ref 37–54)
EOSINOPHIL # BLD AUTO: 0.11 10*3/MM3 (ref 0–0.4)
EOSINOPHIL NFR BLD AUTO: 1.4 % (ref 0.3–6.2)
ERYTHROCYTE [DISTWIDTH] IN BLOOD BY AUTOMATED COUNT: 15.4 % (ref 12.3–15.4)
ERYTHROCYTE [SEDIMENTATION RATE] IN BLOOD: 20 MM/HR (ref 0–30)
GFR SERPL CREATININE-BSD FRML MDRD: 63 ML/MIN/1.73
GLOBULIN UR ELPH-MCNC: 4.2 GM/DL
GLUCOSE BLD-MCNC: 132 MG/DL (ref 65–99)
HCT VFR BLD AUTO: 41.3 % (ref 34–46.6)
HGB BLD-MCNC: 13.2 G/DL (ref 12–15.9)
IMM GRANULOCYTES # BLD AUTO: 0.04 10*3/MM3 (ref 0–0.05)
IMM GRANULOCYTES NFR BLD AUTO: 0.5 % (ref 0–0.5)
LYMPHOCYTES # BLD AUTO: 2.09 10*3/MM3 (ref 0.7–3.1)
LYMPHOCYTES NFR BLD AUTO: 26.4 % (ref 19.6–45.3)
MCH RBC QN AUTO: 30.9 PG (ref 26.6–33)
MCHC RBC AUTO-ENTMCNC: 32 G/DL (ref 31.5–35.7)
MCV RBC AUTO: 96.7 FL (ref 79–97)
MONOCYTES # BLD AUTO: 0.86 10*3/MM3 (ref 0.1–0.9)
MONOCYTES NFR BLD AUTO: 10.8 % (ref 5–12)
NEUTROPHILS # BLD AUTO: 4.77 10*3/MM3 (ref 1.7–7)
NEUTROPHILS NFR BLD AUTO: 60.1 % (ref 42.7–76)
NRBC BLD AUTO-RTO: 0 /100 WBC (ref 0–0.2)
NT-PROBNP SERPL-MCNC: 3822 PG/ML (ref 5–1800)
PLATELET # BLD AUTO: 200 10*3/MM3 (ref 140–450)
PMV BLD AUTO: 10.7 FL (ref 6–12)
POTASSIUM BLD-SCNC: 3.3 MMOL/L (ref 3.5–5.2)
PROT SERPL-MCNC: 6.6 G/DL (ref 6–8.5)
RBC # BLD AUTO: 4.27 10*6/MM3 (ref 3.77–5.28)
SODIUM BLD-SCNC: 140 MMOL/L (ref 136–145)
WBC NRBC COR # BLD: 7.93 10*3/MM3 (ref 3.4–10.8)

## 2019-12-23 PROCEDURE — 93923 UPR/LXTR ART STDY 3+ LVLS: CPT | Performed by: RADIOLOGY

## 2019-12-23 PROCEDURE — 85025 COMPLETE CBC W/AUTO DIFF WBC: CPT | Performed by: NURSE PRACTITIONER

## 2019-12-23 PROCEDURE — 80053 COMPREHEN METABOLIC PANEL: CPT | Performed by: NURSE PRACTITIONER

## 2019-12-23 PROCEDURE — 99284 EMERGENCY DEPT VISIT MOD MDM: CPT

## 2019-12-23 PROCEDURE — 93970 EXTREMITY STUDY: CPT

## 2019-12-23 PROCEDURE — 86140 C-REACTIVE PROTEIN: CPT | Performed by: NURSE PRACTITIONER

## 2019-12-23 PROCEDURE — 85652 RBC SED RATE AUTOMATED: CPT | Performed by: NURSE PRACTITIONER

## 2019-12-23 PROCEDURE — 93970 EXTREMITY STUDY: CPT | Performed by: RADIOLOGY

## 2019-12-23 PROCEDURE — 93923 UPR/LXTR ART STDY 3+ LVLS: CPT

## 2019-12-23 PROCEDURE — 83880 ASSAY OF NATRIURETIC PEPTIDE: CPT | Performed by: NURSE PRACTITIONER

## 2019-12-23 RX ORDER — CLINDAMYCIN HYDROCHLORIDE 300 MG/1
300 CAPSULE ORAL EVERY 6 HOURS
Qty: 40 CAPSULE | Refills: 0 | Status: SHIPPED | OUTPATIENT
Start: 2019-12-23 | End: 2020-01-02

## 2019-12-23 RX ORDER — SODIUM CHLORIDE 0.9 % (FLUSH) 0.9 %
10 SYRINGE (ML) INJECTION AS NEEDED
Status: DISCONTINUED | OUTPATIENT
Start: 2019-12-23 | End: 2019-12-23 | Stop reason: HOSPADM

## 2019-12-23 NOTE — ED NOTES
Patient and family state that patient has bilateral leg swelling and weeping from lower legs.     America Angulo RN  12/23/19 6028

## 2019-12-24 NOTE — ED NOTES
Discharge instructions reviewed with patient, patient instructed to return to ED if symptoms worsen or if any new problems arise. Patient verbalizes understanding of discharge instructions, patient out of ED in wheelchair with family. No acute distress noted.     Amreica Angulo RN  12/23/19 0101

## 2019-12-25 NOTE — ED PROVIDER NOTES
"Subjective   Patient reports bilateral worsening swelling of her legs.  She says that her legs are always swollen but over the past 2 weeks they have gotten much worse.  They are now \"leaking fluid\".  She denies shortness of breath.  She denies fever, nausea, vomiting, or chills.          Review of Systems   Constitutional: Negative.  Negative for fever.   HENT: Negative.    Respiratory: Negative.    Cardiovascular: Negative.  Negative for chest pain.   Gastrointestinal: Negative.  Negative for abdominal pain.   Endocrine: Negative.    Genitourinary: Negative.  Negative for dysuria.   Skin: Negative.    Neurological: Negative.    Psychiatric/Behavioral: Negative.    All other systems reviewed and are negative.      Past Medical History:   Diagnosis Date   • Arthritis    • Atrial fibrillation (CMS/HCC)     with RVR   • COPD (chronic obstructive pulmonary disease) (CMS/HCC)    • Disease of thyroid gland    • Hypertension    • Osteoporosis    • Stroke (CMS/HCC)        Allergies   Allergen Reactions   • Sulfa Antibiotics Hives       Past Surgical History:   Procedure Laterality Date   • APPENDECTOMY     • BRAIN SURGERY Bilateral     biopsy   • BREAST BIOPSY Bilateral yrs ago    benign   • BREAST BIOPSY Left 10/1/2019    Procedure: EXCISION OF LEFT NIPPLE;  Surgeon: Macey Abbasi MD;  Location: [facility];  Service: General   • ELECTRICAL CARDIOVERSION  3/13/2019        • FRACTURE SURGERY      left knee   • KNEE SURGERY      fixation left knee/knee   • SKIN BIOPSY     • TODD         Family History   Problem Relation Age of Onset   • Alzheimer's disease Father    • Breast cancer Neg Hx        Social History     Socioeconomic History   • Marital status:      Spouse name: Not on file   • Number of children: Not on file   • Years of education: Not on file   • Highest education level: Not on file   Tobacco Use   • Smoking status: Former Smoker     Packs/day: 1.00     Years: 17.00     Pack years: 17.00     Types: " Cigarettes   • Smokeless tobacco: Never Used   • Tobacco comment: quit 30 years ago    Substance and Sexual Activity   • Alcohol use: No   • Drug use: No   • Sexual activity: Defer     Birth control/protection: Post-menopausal           Objective   Physical Exam   Constitutional: She is oriented to person, place, and time. She appears well-developed and well-nourished. No distress.   HENT:   Head: Normocephalic and atraumatic.   Right Ear: External ear normal.   Left Ear: External ear normal.   Nose: Nose normal.   Eyes: Pupils are equal, round, and reactive to light. Conjunctivae and EOM are normal.   Neck: Normal range of motion. Neck supple. No JVD present. No tracheal deviation present.   Cardiovascular: Normal rate, regular rhythm and normal heart sounds.   No murmur heard.  Pulmonary/Chest: Effort normal and breath sounds normal. No respiratory distress. She has no wheezes.   Abdominal: Soft. Bowel sounds are normal. There is no tenderness.   Musculoskeletal: Normal range of motion. She exhibits no edema or deformity.   Neurological: She is alert and oriented to person, place, and time. No cranial nerve deficit.   Skin: Skin is warm and dry. No rash noted. She is not diaphoretic. No erythema. No pallor.   +3 pitting edema noted in bilateral lower extremities.  There is weeping noted more to the right than    Psychiatric: She has a normal mood and affect. Her behavior is normal. Thought content normal.   Nursing note and vitals reviewed.      Procedures           ED Course                                               MDM  Number of Diagnoses or Management Options  Bilateral lower extremity edema: established and worsening  Peripheral arterial disease (CMS/HCC): established and worsening     Amount and/or Complexity of Data Reviewed  Clinical lab tests: reviewed  Tests in the radiology section of CPT®: reviewed        Final diagnoses:   Peripheral arterial disease (CMS/HCC)   Bilateral lower extremity edema             Suly Arnold, APRN  12/25/19 0137

## 2019-12-27 NOTE — PLAN OF CARE
Problem: Fall Risk (Adult)  Goal: Absence of Fall  Outcome: Ongoing (interventions implemented as appropriate)      Problem: Patient Care Overview  Goal: Plan of Care Review  Outcome: Ongoing (interventions implemented as appropriate)    Goal: Individualization and Mutuality  Outcome: Ongoing (interventions implemented as appropriate)    Goal: Discharge Needs Assessment  Outcome: Ongoing (interventions implemented as appropriate)    Goal: Interprofessional Rounds/Family Conf  Outcome: Ongoing (interventions implemented as appropriate)      Problem: Cardiac Output Decreased (Adult)  Goal: Identify Related Risk Factors and Signs and Symptoms  Outcome: Ongoing (interventions implemented as appropriate)      Problem: Patient Care Overview  Goal: Plan of Care Review  Outcome: Ongoing (interventions implemented as appropriate)    Goal: Individualization and Mutuality  Outcome: Ongoing (interventions implemented as appropriate)    Goal: Interprofessional Rounds/Family Conf  Outcome: Ongoing (interventions implemented as appropriate)      Problem: Skin Injury Risk (Adult)  Goal: Identify Related Risk Factors and Signs and Symptoms  Outcome: Ongoing (interventions implemented as appropriate)         Please call [phone] to make a follow up appointment within 1 week with Dr. Anthony or Dr. Tomas. Clinic is located at [address] [place] [address] on Tuesdays (2-4pm) or Thursdays (9am-1pm).

## 2020-01-03 ENCOUNTER — OFFICE VISIT (OUTPATIENT)
Dept: CARDIOLOGY | Facility: CLINIC | Age: 85
End: 2020-01-03

## 2020-01-03 VITALS
HEIGHT: 63 IN | HEART RATE: 64 BPM | OXYGEN SATURATION: 97 % | BODY MASS INDEX: 32.21 KG/M2 | SYSTOLIC BLOOD PRESSURE: 121 MMHG | DIASTOLIC BLOOD PRESSURE: 59 MMHG | WEIGHT: 181.8 LBS

## 2020-01-03 DIAGNOSIS — I51.89 DIASTOLIC DYSFUNCTION: ICD-10-CM

## 2020-01-03 DIAGNOSIS — I48.0 PAROXYSMAL ATRIAL FIBRILLATION (HCC): ICD-10-CM

## 2020-01-03 DIAGNOSIS — I10 ESSENTIAL HYPERTENSION: Primary | ICD-10-CM

## 2020-01-03 PROCEDURE — 99213 OFFICE O/P EST LOW 20 MIN: CPT | Performed by: PHYSICIAN ASSISTANT

## 2020-01-03 RX ORDER — FUROSEMIDE 40 MG/1
40 TABLET ORAL DAILY
Qty: 7 TABLET | Refills: 0 | Status: SHIPPED | OUTPATIENT
Start: 2020-01-03 | End: 2020-02-04 | Stop reason: SDUPTHER

## 2020-01-03 RX ORDER — POTASSIUM CHLORIDE 750 MG/1
10 TABLET, FILM COATED, EXTENDED RELEASE ORAL 2 TIMES DAILY
Qty: 14 TABLET | Refills: 0 | Status: SHIPPED | OUTPATIENT
Start: 2020-01-03

## 2020-01-03 NOTE — PROGRESS NOTES
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 female with a past medical history significant for persistent atrial fibrillation, chronic diastolic heart failure, essential hypertension.  Patient comes in for routine cardiology follow-up.    She has been only concern today or complaint that she brings up his pedal edema.  She reports she was even seen in the emergency department where she was diagnosed with cellulitis she has been taking clindamycin and reports the redness has improved however the swelling has not gone down any and she is leaking fluid at times as well.  She does deny any shortness of breath, orthopnea, or weight gain.  She also denies any chest pains, dizziness, or syncope.  Her  who is with her today reports that he has wrapped her feet in bandages which did become saturated with fluid.    Allergies   Allergen Reactions   • Sulfa Antibiotics Hives   :      Current Outpatient Medications:   •  amiodarone (PACERONE) 200 MG tablet, Take 1 tablet by mouth Daily., Disp: 90 tablet, Rfl: 3  •  amiodarone (PACERONE) 200 MG tablet, Take 1 tablet by mouth Daily., Disp: 90 tablet, Rfl: 3  •  apixaban (ELIQUIS) 5 MG tablet tablet, Take 1 tablet by mouth 2 (Two) Times a Day.,  Disp: 180 tablet, Rfl: 3  •  apixaban (ELIQUIS) 5 MG tablet tablet, Take 1 tablet by mouth 2 (Two) Times a Day., Disp: 180 tablet, Rfl: 3  •  aspirin 81 MG EC tablet, Take 1 tablet by mouth Daily., Disp: 100 tablet, Rfl: 3  •  dilTIAZem (CARDIZEM) 30 MG tablet, Take 1 tablet by mouth 2 (Two) Times a Day., Disp: 180 tablet, Rfl: 3  •  dilTIAZem (CARDIZEM) 30 MG tablet, Take 1 tablet by mouth 2 (Two) Times a Day., Disp: 180 tablet, Rfl: 3  •  levothyroxine (SYNTHROID, LEVOTHROID) 100 MCG tablet, Take 1 tablet by mouth Every Morning. (Patient taking differently: Take 50 mcg by mouth Every Morning.), Disp: 90 tablet, Rfl: 3  •  levothyroxine (SYNTHROID, LEVOTHROID) 50 MCG tablet, Take 1 tablet by mouth Daily., Disp: 30 tablet, Rfl: 3  •  levothyroxine (SYNTHROID, LEVOTHROID) 75 MCG tablet, Take 1 tablet by mouth Daily., Disp: 90 tablet, Rfl: 3  •  metoprolol tartrate (LOPRESSOR) 25 MG tablet, Take 1 tablet by mouth 2 (Two) Times a Day., Disp: 180 tablet, Rfl: 3  •  metoprolol tartrate (LOPRESSOR) 25 MG tablet, Take 1 tablet by mouth 2 (Two) Times a Day., Disp: 180 tablet, Rfl: 3  •  nitrofurantoin, macrocrystal-monohydrate, (MACROBID) 100 MG capsule, Take 1 capsule by mouth Daily., Disp: 20 capsule, Rfl: 3  •  oxybutynin (DITROPAN) 5 MG tablet, Take 1 tablet by mouth 2 (Two) Times a Day., Disp: 180 tablet, Rfl: 3  •  sulfamethoxazole-trimethoprim (BACTRIM DS) 800-160 MG per tablet, Take 1 tablet by mouth 2 (Two) Times a Day., Disp: 20 tablet, Rfl: 0  •  tamoxifen (NOLVADEX) 20 MG chemo tablet, Take 1 tablet by mouth Daily., Disp: 30 tablet, Rfl: 6  •  tamoxifen (NOLVADEX) 20 MG chemo tablet, take 1 tablet by mouth every day -started 10/9/19-, Disp: 90 tablet, Rfl: 3  •  vitamin B-12 (CYANOCOBALAMIN) 1000 MCG tablet, Take 1,000 mcg by mouth Daily., Disp: , Rfl:   •  furosemide (LASIX) 40 MG tablet, Take 1 tablet by mouth Daily., Disp: 7 tablet, Rfl: 0  •  potassium chloride (K-DUR) 10 MEQ CR tablet, Take 1 tablet by  "mouth 2 (Two) Times a Day., Disp: 14 tablet, Rfl: 0    The following portions of the patient's history were reviewed and updated as appropriate: allergies, current medications, past family history, past medical history, past social history, past surgical history and problem list.    Social History     Tobacco Use   • Smoking status: Former Smoker     Packs/day: 1.00     Years: 17.00     Pack years: 17.00     Types: Cigarettes   • Smokeless tobacco: Never Used   • Tobacco comment: quit 30 years ago    Substance Use Topics   • Alcohol use: No   • Drug use: No       Review of Systems   Constitution: Negative for malaise/fatigue.   Cardiovascular: Positive for leg swelling. Negative for chest pain, dyspnea on exertion and irregular heartbeat.   Respiratory: Negative for cough and shortness of breath.    Hematologic/Lymphatic: Negative for bleeding problem. Does not bruise/bleed easily.   Gastrointestinal: Negative for nausea and vomiting.   Neurological: Negative for weakness.       Objective   Vitals:    01/03/20 1044   BP: 121/59   Pulse: 64   SpO2: 97%   Weight: 82.5 kg (181 lb 12.8 oz)   Height: 160 cm (63\")     Body mass index is 32.2 kg/m².    Physical Exam   Constitutional: She is oriented to person, place, and time. She appears well-developed and well-nourished. No distress.   HENT:   Head: Normocephalic and atraumatic.   Cardiovascular: Normal rate, regular rhythm and normal heart sounds.   Pulmonary/Chest: Effort normal and breath sounds normal. No respiratory distress.   Musculoskeletal: She exhibits no edema.   Neurological: She is alert and oriented to person, place, and time.   Skin: She is not diaphoretic.       Lab Results   Component Value Date     12/23/2019    K 3.3 (L) 12/23/2019     12/23/2019    CO2 30.6 (H) 12/23/2019    BUN 10 12/23/2019    CREATININE 0.85 12/23/2019    GLUCOSE 132 (H) 12/23/2019    CALCIUM 8.2 (L) 12/23/2019    AST 30 12/23/2019    ALT 14 12/23/2019    ALKPHOS 79 " 12/23/2019     Lab Results   Component Value Date    CKTOTAL 28 03/12/2019     Lab Results   Component Value Date    WBC 7.93 12/23/2019    HGB 13.2 12/23/2019    HCT 41.3 12/23/2019     12/23/2019     Lab Results   Component Value Date    INR 1.21 (H) 03/11/2019    INR 1.18 (H) 03/11/2019     Lab Results   Component Value Date    MG 1.8 03/15/2019     Lab Results   Component Value Date    TSH 0.530 03/11/2019      No results found for: BNP    During this visit the following were done:  Labs Reviewed [x]    Labs Ordered []    Radiology Reports Reviewed [x]    Radiology Ordered []    PCP Records Reviewed []    Referring Provider Records Reviewed []    ER Records Reviewed []    Hospital Records Reviewed []    History Obtained From Family []    Radiology Images Reviewed []    Other Reviewed []    Records Requested []       Procedures    Assessment/Plan    Diagnosis Plan   1. Essential hypertension  Basic Metabolic Panel   2. Paroxysmal atrial fibrillation (CMS/HCC)     3. Diastolic dysfunction              Recommendations:  1. Since patient does have known diastolic dysfunction and BNP was mildly elevated in the emergency room I will give her a 7-day supply of 20 mg Lasix daily to take with potassium twice daily.  2. I also encouraged patient to try compression socks to see if this helps as well as propping her legs up when she sleeps or sitting.   3. I also had a discussion with patient regarding salt intake with a goal of having no more than 2000 mg of sodium per day.  I explained to avoid foods that are high in sodium such as canned vegetables, canned soups, premade dinners, pickles, and lunch meat.  Expressed understanding.    Return in about 1 month (around 2/3/2020).    As always, I appreciate very much the opportunity to participate in the cardiovascular care of your patients.      With Best Regards,    Abraham Martinez PA-C

## 2020-01-22 DIAGNOSIS — C50.912: Primary | ICD-10-CM

## 2020-01-30 ENCOUNTER — HOSPITAL ENCOUNTER (OUTPATIENT)
Dept: MAMMOGRAPHY | Facility: HOSPITAL | Age: 85
Discharge: HOME | End: 2020-01-30
Admitting: SURGERY

## 2020-01-30 DIAGNOSIS — C50.912: ICD-10-CM

## 2020-01-30 PROCEDURE — G0279 TOMOSYNTHESIS, MAMMO: HCPCS | Performed by: RADIOLOGY

## 2020-01-30 PROCEDURE — 77065 DX MAMMO INCL CAD UNI: CPT

## 2020-01-30 PROCEDURE — 77065 DX MAMMO INCL CAD UNI: CPT | Performed by: RADIOLOGY

## 2020-01-30 PROCEDURE — G0279 TOMOSYNTHESIS, MAMMO: HCPCS

## 2020-02-04 ENCOUNTER — OFFICE VISIT (OUTPATIENT)
Dept: CARDIOLOGY | Facility: CLINIC | Age: 85
End: 2020-02-04

## 2020-02-04 VITALS
HEART RATE: 61 BPM | HEIGHT: 63 IN | OXYGEN SATURATION: 95 % | SYSTOLIC BLOOD PRESSURE: 133 MMHG | WEIGHT: 175 LBS | DIASTOLIC BLOOD PRESSURE: 71 MMHG | BODY MASS INDEX: 31.01 KG/M2

## 2020-02-04 DIAGNOSIS — I10 ESSENTIAL HYPERTENSION: ICD-10-CM

## 2020-02-04 DIAGNOSIS — R60.0 PEDAL EDEMA: ICD-10-CM

## 2020-02-04 DIAGNOSIS — I51.89 DIASTOLIC DYSFUNCTION: ICD-10-CM

## 2020-02-04 DIAGNOSIS — I48.91 ATRIAL FIBRILLATION WITH RVR (HCC): Primary | ICD-10-CM

## 2020-02-04 DIAGNOSIS — I48.0 PAROXYSMAL ATRIAL FIBRILLATION (HCC): ICD-10-CM

## 2020-02-04 PROCEDURE — 99214 OFFICE O/P EST MOD 30 MIN: CPT | Performed by: PHYSICIAN ASSISTANT

## 2020-02-04 RX ORDER — FUROSEMIDE 20 MG/1
20 TABLET ORAL DAILY PRN
Qty: 30 TABLET | Refills: 0 | Status: SHIPPED | OUTPATIENT
Start: 2020-02-04

## 2020-02-04 RX ORDER — FUROSEMIDE 40 MG/1
40 TABLET ORAL DAILY
Qty: 30 TABLET | Refills: 0 | Status: SHIPPED | OUTPATIENT
Start: 2020-02-04 | End: 2020-02-04 | Stop reason: SDUPTHER

## 2020-02-04 NOTE — PROGRESS NOTES
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    • Results     labs   • Shortness of Breath   • Edema     Feet       Kayleen Brasher is a pleasant 88 y.o. female with a past medical history significant for persistent atrial fibrillation, chronic diastolic heart failure, essential hypertension.  Patient comes in for routine cardiology follow-up.    Mrs. Brasher, reports that she had excellent response to 20 mg of p.o. Lasix that I prescribed at last visit she does still have pretty significant pedal edema but does report it had markedly improved it is no longer leaking fluid and is down in size.  She reports she was urinating at least once or twice per hour when taking the Lasix.  She does still deny any shortness of breath or change in her orthopnea.  She does have chronic orthopnea requiring her to sleep in her recliner which is been her current sleeping situation for over a year now.  She did have some blood work done which showed stable renal function with creatinine of 1.08 which is a slight increase from December with a creatinine of 0.85.    Allergies   Allergen Reactions   • Sulfa Antibiotics Hives   :      Current Outpatient Medications:   •  amiodarone (PACERONE) 200 MG tablet, Take 1 tablet by mouth Daily., Disp:  90 tablet, Rfl: 3  •  apixaban (ELIQUIS) 5 MG tablet tablet, Take 1 tablet by mouth 2 (Two) Times a Day., Disp: 180 tablet, Rfl: 3  •  dilTIAZem (CARDIZEM) 30 MG tablet, Take 1 tablet by mouth 2 (Two) Times a Day., Disp: 180 tablet, Rfl: 3  •  levothyroxine (SYNTHROID, LEVOTHROID) 50 MCG tablet, Take 1 tablet by mouth Daily., Disp: 30 tablet, Rfl: 3  •  metoprolol tartrate (LOPRESSOR) 25 MG tablet, Take 1 tablet by mouth 2 (Two) Times a Day., Disp: 180 tablet, Rfl: 3  •  oxybutynin (DITROPAN) 5 MG tablet, Take 1 tablet by mouth 2 (Two) Times a Day., Disp: 180 tablet, Rfl: 3  •  tamoxifen (NOLVADEX) 20 MG chemo tablet, Take 1 tablet by mouth Daily., Disp: 30 tablet, Rfl: 6  •  tamoxifen (NOLVADEX) 20 MG chemo tablet, take 1 tablet by mouth every day -started 10/9/19-, Disp: 90 tablet, Rfl: 3  •  amiodarone (PACERONE) 200 MG tablet, Take 1 tablet by mouth Daily., Disp: 90 tablet, Rfl: 3  •  apixaban (ELIQUIS) 5 MG tablet tablet, Take 1 tablet by mouth 2 (Two) Times a Day., Disp: 180 tablet, Rfl: 3  •  aspirin 81 MG EC tablet, Take 1 tablet by mouth Daily., Disp: 100 tablet, Rfl: 3  •  dilTIAZem (CARDIZEM) 30 MG tablet, Take 1 tablet by mouth 2 (Two) Times a Day., Disp: 180 tablet, Rfl: 3  •  furosemide (LASIX) 20 MG tablet, Take 1 tablet by mouth Daily As Needed (for increased pedal edema or worsening shortness of breath)., Disp: 30 tablet, Rfl: 0  •  levothyroxine (SYNTHROID, LEVOTHROID) 100 MCG tablet, Take 1 tablet by mouth Every Morning. (Patient taking differently: Take 50 mcg by mouth Every Morning.), Disp: 90 tablet, Rfl: 3  •  levothyroxine (SYNTHROID, LEVOTHROID) 75 MCG tablet, Take 1 tablet by mouth Daily., Disp: 90 tablet, Rfl: 3  •  metoprolol tartrate (LOPRESSOR) 25 MG tablet, Take 1 tablet by mouth 2 (Two) Times a Day., Disp: 180 tablet, Rfl: 3  •  nitrofurantoin, macrocrystal-monohydrate, (MACROBID) 100 MG capsule, Take 1 capsule by mouth Daily., Disp: 20 capsule, Rfl: 3  •  potassium chloride (K-DUR)  "10 MEQ CR tablet, Take 1 tablet by mouth 2 (Two) Times a Day., Disp: 14 tablet, Rfl: 0  •  sulfamethoxazole-trimethoprim (BACTRIM DS) 800-160 MG per tablet, Take 1 tablet by mouth 2 (Two) Times a Day., Disp: 20 tablet, Rfl: 0  •  vitamin B-12 (CYANOCOBALAMIN) 1000 MCG tablet, Take 1,000 mcg by mouth Daily., Disp: , Rfl:     The following portions of the patient's history were reviewed and updated as appropriate: allergies, current medications, past family history, past medical history, past social history, past surgical history and problem list.    Social History     Tobacco Use   • Smoking status: Former Smoker     Packs/day: 1.00     Years: 17.00     Pack years: 17.00     Types: Cigarettes   • Smokeless tobacco: Never Used   • Tobacco comment: quit 30 years ago    Substance Use Topics   • Alcohol use: No   • Drug use: No       Review of Systems   Constitution: Negative for malaise/fatigue.   Cardiovascular: Positive for leg swelling and orthopnea. Negative for chest pain, dyspnea on exertion and irregular heartbeat.   Respiratory: Negative for cough and shortness of breath.    Hematologic/Lymphatic: Negative for bleeding problem. Does not bruise/bleed easily.   Gastrointestinal: Negative for nausea and vomiting.   Neurological: Negative for weakness.       Objective   Vitals:    02/04/20 1058   BP: 133/71   BP Location: Right arm   Patient Position: Sitting   Cuff Size: Adult   Pulse: 61   SpO2: 95%   Weight: 79.4 kg (175 lb)   Height: 160 cm (63\")     Body mass index is 31 kg/m².    Physical Exam   Constitutional: She is oriented to person, place, and time. She appears well-developed and well-nourished. No distress.   Walks with a walker   HENT:   Head: Normocephalic and atraumatic.   Cardiovascular: Normal rate, regular rhythm and normal heart sounds.   Pulmonary/Chest: Effort normal and breath sounds normal. No respiratory distress.   Musculoskeletal: She exhibits edema ( 2+ pedal edema bilaterally). "   Neurological: She is alert and oriented to person, place, and time.   Skin: She is not diaphoretic.       Lab Results   Component Value Date     12/23/2019    K 3.3 (L) 12/23/2019     12/23/2019    CO2 30.6 (H) 12/23/2019    BUN 10 12/23/2019    CREATININE 0.85 12/23/2019    GLUCOSE 132 (H) 12/23/2019    CALCIUM 8.2 (L) 12/23/2019    AST 30 12/23/2019    ALT 14 12/23/2019    ALKPHOS 79 12/23/2019     Lab Results   Component Value Date    CKTOTAL 28 03/12/2019     Lab Results   Component Value Date    WBC 7.93 12/23/2019    HGB 13.2 12/23/2019    HCT 41.3 12/23/2019     12/23/2019     Lab Results   Component Value Date    INR 1.21 (H) 03/11/2019    INR 1.18 (H) 03/11/2019     Lab Results   Component Value Date    MG 1.8 03/15/2019     Lab Results   Component Value Date    TSH 0.530 03/11/2019      No results found for: BNP    During this visit the following were done:  Labs Reviewed [x]    Labs Ordered []    Radiology Reports Reviewed [x]    Radiology Ordered []    PCP Records Reviewed []    Referring Provider Records Reviewed []    ER Records Reviewed []    Hospital Records Reviewed []    History Obtained From Family []    Radiology Images Reviewed []    Other Reviewed []    Records Requested []       Procedures    Assessment/Plan    Diagnosis Plan   1. Atrial fibrillation with RVR (CMS/HCC)     2. Essential hypertension     3. Paroxysmal atrial fibrillation (CMS/HCC)     4. Diastolic dysfunction     5. Pedal edema  US Venous Doppler Lower Extremity Bilat Duplex for Insufficiency            Recommendations:  1. I will evaluate patient's pedal edema with venous reflux study as I do believe her main problem is venous insufficiency rather than her diastolic dysfunction as she is not short of breath and clinically appears compensated.  2. I will still prescribe her some 20 mg Lasix to take only as needed for increased pedal edema, shortness of breath, or weight gain or than 3 to 4 pounds in 1  day.      Return in about 3 months (around 5/4/2020).    As always, I appreciate very much the opportunity to participate in the cardiovascular care of your patients.      With Best Regards,    Abraham Martinez PA-C

## 2020-02-13 ENCOUNTER — HOSPITAL ENCOUNTER (OUTPATIENT)
Dept: CARDIOLOGY | Facility: HOSPITAL | Age: 85
Discharge: HOME | End: 2020-02-13
Admitting: PHYSICIAN ASSISTANT

## 2020-02-13 DIAGNOSIS — R60.0 PEDAL EDEMA: ICD-10-CM

## 2020-02-13 PROCEDURE — 93970 EXTREMITY STUDY: CPT | Performed by: RADIOLOGY

## 2020-02-13 PROCEDURE — 93970 EXTREMITY STUDY: CPT

## 2020-02-18 ENCOUNTER — TELEPHONE (OUTPATIENT)
Dept: CARDIOLOGY | Facility: CLINIC | Age: 85
End: 2020-02-18

## 2020-02-18 NOTE — TELEPHONE ENCOUNTER
Notes recorded by Abraham Martinez PA-C on 2/14/2020 at 2:10 PM EST  No sonographic findings of DVT      Advised patients  of test results hippa approved. Sent results to PCP.

## 2020-03-02 ENCOUNTER — TELEPHONE (OUTPATIENT)
Dept: WOUND CARE | Facility: HOSPITAL | Age: 85
End: 2020-03-02

## (undated) DEVICE — ANTIBACTERIAL UNDYED BRAIDED (POLYGLACTIN 910), SYNTHETIC ABSORBABLE SUTURE: Brand: COATED VICRYL

## (undated) DEVICE — DRAPE,UTILTY,TAPE,15X26, 4EA/PK: Brand: MEDLINE

## (undated) DEVICE — DRAPE,T,LAPARO,TRANS,STERILE: Brand: MEDLINE

## (undated) DEVICE — PAD GRND REM POLYHESIVE A/ DISP

## (undated) DEVICE — SUT SILK 2/0 SH 30IN K833H

## (undated) DEVICE — ANESTHESIA CIRCUIT ADULT-LF: Brand: MEDLINE INDUSTRIES, INC.

## (undated) DEVICE — PK BASIC 70

## (undated) DEVICE — AMD ANTIMICROBIAL NON-ADHERENT ISLAND DRESSING,0.2% POLYHEXAMETHYLENE BIGUANIDE HCI (PHMB): Brand: TELFA

## (undated) DEVICE — SUT VIC 0/0 CT1 27IN J260H

## (undated) DEVICE — HOLDER: Brand: DEROYAL

## (undated) DEVICE — GLV SURG PREMIERPRO MIC LTX PF SZ7 BRN

## (undated) DEVICE — UNDYED BRAIDED (POLYGLACTIN 910), SYNTHETIC ABSORBABLE SUTURE: Brand: COATED VICRYL

## (undated) DEVICE — SUT VIC 3/0 SH 27IN J416H

## (undated) DEVICE — KT INK TISS MARGINMARKER STD 6COLOR

## (undated) DEVICE — APPL CHLORAPREP W/TINT 26ML ORNG

## (undated) DEVICE — 3M™ STERI-STRIP™ REINFORCED ADHESIVE SKIN CLOSURES, R1547, 1/2 IN X 4 IN (12 MM X 100 MM), 6 STRIPS/ENVELOPE: Brand: 3M™ STERI-STRIP™

## (undated) DEVICE — SUT VIC 4/0 P3 18IN J494G